# Patient Record
Sex: FEMALE | Race: WHITE | HISPANIC OR LATINO | URBAN - METROPOLITAN AREA
[De-identification: names, ages, dates, MRNs, and addresses within clinical notes are randomized per-mention and may not be internally consistent; named-entity substitution may affect disease eponyms.]

---

## 2020-11-24 ENCOUNTER — OFFICE VISIT (OUTPATIENT)
Dept: URBAN - METROPOLITAN AREA CLINIC 118 | Facility: CLINIC | Age: 65
End: 2020-11-24
Payer: COMMERCIAL

## 2020-11-24 ENCOUNTER — LAB OUTSIDE AN ENCOUNTER (OUTPATIENT)
Dept: URBAN - METROPOLITAN AREA CLINIC 118 | Facility: CLINIC | Age: 65
End: 2020-11-24

## 2020-11-24 DIAGNOSIS — R10.84 GENERALIZED ABDOMINAL PAIN: ICD-10-CM

## 2020-11-24 DIAGNOSIS — K59.00 CONSTIPATION, UNSPECIFIED CONSTIPATION TYPE: ICD-10-CM

## 2020-11-24 DIAGNOSIS — K58.1 IRRITABLE BOWEL SYNDROME WITH CONSTIPATION: ICD-10-CM

## 2020-11-24 DIAGNOSIS — R14.0 ABDOMINAL BLOATING: ICD-10-CM

## 2020-11-24 DIAGNOSIS — R10.13 EPIGASTRIC PAIN: ICD-10-CM

## 2020-11-24 DIAGNOSIS — Z86.010 PERSONAL HISTORY OF COLONIC POLYPS: ICD-10-CM

## 2020-11-24 PROCEDURE — 4004F PT TOBACCO SCREEN RCVD TLK: CPT | Performed by: INTERNAL MEDICINE

## 2020-11-24 PROCEDURE — 3017F COLORECTAL CA SCREEN DOC REV: CPT | Performed by: INTERNAL MEDICINE

## 2020-11-24 PROCEDURE — 99204 OFFICE O/P NEW MOD 45 MIN: CPT | Performed by: INTERNAL MEDICINE

## 2020-11-24 PROCEDURE — G8417 CALC BMI ABV UP PARAM F/U: HCPCS | Performed by: INTERNAL MEDICINE

## 2020-11-24 PROCEDURE — G8482 FLU IMMUNIZE ORDER/ADMIN: HCPCS | Performed by: INTERNAL MEDICINE

## 2020-11-24 PROCEDURE — G8427 DOCREV CUR MEDS BY ELIG CLIN: HCPCS | Performed by: INTERNAL MEDICINE

## 2020-11-24 RX ORDER — CARVEDILOL 6.25 MG/1
1 TABLET WITH FOOD TABLET, FILM COATED ORAL TWICE A DAY
Status: ACTIVE | COMMUNITY

## 2020-11-24 RX ORDER — POLYETHYLENE GLYCOL 3350, SODIUM SULFATE ANHYDROUS, SODIUM BICARBONATE, SODIUM CHLORIDE, POTASSIUM CHLORIDE 227.1; 21.5; 6.36; 5.53; .754 G/L; G/L; G/L; G/L; G/L
AS DIRECTED POWDER, FOR SOLUTION ORAL ONCE
Qty: 1 GALLON | Refills: 0 | OUTPATIENT
Start: 2020-11-24 | End: 2020-11-25

## 2020-11-24 RX ORDER — FLUTICASONE PROPIONATE 50 UG/1
1 SPRAY IN EACH NOSTRIL SPRAY, METERED NASAL ONCE A DAY
Status: ACTIVE | COMMUNITY

## 2020-11-24 RX ORDER — PANTOPRAZOLE SODIUM 40 MG/1
1 TABLET TABLET, DELAYED RELEASE ORAL ONCE A DAY
Status: ACTIVE | COMMUNITY

## 2020-11-24 RX ORDER — DICLOFENAC 35 MG/1
1 CAPSULE AS NEEDED CAPSULE ORAL THREE TIMES A DAY
Status: ACTIVE | COMMUNITY

## 2020-11-24 RX ORDER — ASPIRIN 81 MG/1
1 TABLET TABLET, CHEWABLE ORAL ONCE A DAY
Status: ACTIVE | COMMUNITY

## 2020-11-24 RX ORDER — ATORVASTATIN CALCIUM 10 MG/1
1 TABLET TABLET, FILM COATED ORAL ONCE A DAY
Status: ACTIVE | COMMUNITY

## 2020-11-24 RX ORDER — ALLOPURINOL 100 MG/1
1 TABLET TABLET ORAL ONCE A DAY
Status: ACTIVE | COMMUNITY

## 2020-11-24 RX ORDER — LISINOPRIL 20 MG/1
1 TABLET TABLET ORAL ONCE A DAY
Status: ACTIVE | COMMUNITY

## 2020-11-24 RX ORDER — GABAPENTIN 600 MG/1
1 TABLET TABLET, FILM COATED ORAL ONCE A DAY
Status: ACTIVE | COMMUNITY

## 2020-11-24 RX ORDER — MELATONIN 5 MG
1 TABLET IN THE EVENING TABLET ORAL ONCE A DAY
Status: ACTIVE | COMMUNITY

## 2020-11-24 RX ORDER — DICYCLOMINE HYDROCHLORIDE 20 MG/1
1 TABLET TABLET ORAL THREE TIMES A DAY
Status: ACTIVE | COMMUNITY

## 2020-11-24 RX ORDER — TURMERIC/TURMERIC ROOT EXTRACT 450MG-50MG
AS DIRECTED CAPSULE ORAL
Status: ACTIVE | COMMUNITY

## 2020-11-24 RX ORDER — MELOXICAM 15 MG/1
1 TABLET TABLET ORAL ONCE A DAY
Status: ACTIVE | COMMUNITY

## 2020-11-24 RX ORDER — GLIPIZIDE 5 MG/1
1 TABLET 30 MINUTES BEFORE BREAKFAST TABLET ORAL ONCE A DAY
Status: ACTIVE | COMMUNITY

## 2020-11-24 RX ORDER — TRAMADOL HYDROCHLORIDE 50 MG/1
1 TABLET AS NEEDED TABLET, FILM COATED ORAL ONCE A DAY
Status: ACTIVE | COMMUNITY

## 2020-11-24 NOTE — HPI-TODAY'S VISIT:
This is a 66 yo female with pmh of CKD, depression, fibromyalgia, GERD and asthma here for new patient visit for abdominal pain and bloating.  She reports having postprandial abdominal bloating/distension and generalized pain for past several months. No particular trigger foods. No nausea/vomiting. She also has constipation with stool every 3-4 days. Has tried miralax without much improvement.  She previously was evaluated in 2015 at [facility] for chronic diarrhea. EGD and colonoscopy done. Colonoscopy showed serrated polyps and inadequate prep. Diarrhea improved after stopping metformin.

## 2020-12-22 ENCOUNTER — OFFICE VISIT (OUTPATIENT)
Dept: URBAN - METROPOLITAN AREA SURGERY CENTER 23 | Facility: SURGERY CENTER | Age: 65
End: 2020-12-22
Payer: COMMERCIAL

## 2020-12-22 DIAGNOSIS — Z86.010 H/O ADENOMATOUS POLYP OF COLON: ICD-10-CM

## 2020-12-22 DIAGNOSIS — D12.2 ADENOMA OF ASCENDING COLON: ICD-10-CM

## 2020-12-22 PROCEDURE — G8907 PT DOC NO EVENTS ON DISCHARG: HCPCS | Performed by: INTERNAL MEDICINE

## 2020-12-22 PROCEDURE — 45385 COLONOSCOPY W/LESION REMOVAL: CPT | Performed by: INTERNAL MEDICINE

## 2020-12-22 PROCEDURE — G9936 PMH PLYP/NEO CO/RECT/JUN/ANS: HCPCS | Performed by: INTERNAL MEDICINE

## 2020-12-22 RX ORDER — TURMERIC/TURMERIC ROOT EXTRACT 450MG-50MG
AS DIRECTED CAPSULE ORAL
Status: ACTIVE | COMMUNITY

## 2020-12-22 RX ORDER — MELATONIN 5 MG
1 TABLET IN THE EVENING TABLET ORAL ONCE A DAY
Status: ACTIVE | COMMUNITY

## 2020-12-22 RX ORDER — DICYCLOMINE HYDROCHLORIDE 20 MG/1
1 TABLET TABLET ORAL THREE TIMES A DAY
Status: ACTIVE | COMMUNITY

## 2020-12-22 RX ORDER — GABAPENTIN 600 MG/1
1 TABLET TABLET, FILM COATED ORAL ONCE A DAY
Status: ACTIVE | COMMUNITY

## 2020-12-22 RX ORDER — GLIPIZIDE 5 MG/1
1 TABLET 30 MINUTES BEFORE BREAKFAST TABLET ORAL ONCE A DAY
Status: ACTIVE | COMMUNITY

## 2020-12-22 RX ORDER — FLUTICASONE PROPIONATE 50 UG/1
1 SPRAY IN EACH NOSTRIL SPRAY, METERED NASAL ONCE A DAY
Status: ACTIVE | COMMUNITY

## 2020-12-22 RX ORDER — ALLOPURINOL 100 MG/1
1 TABLET TABLET ORAL ONCE A DAY
Status: ACTIVE | COMMUNITY

## 2020-12-22 RX ORDER — ATORVASTATIN CALCIUM 10 MG/1
1 TABLET TABLET, FILM COATED ORAL ONCE A DAY
Status: ACTIVE | COMMUNITY

## 2020-12-22 RX ORDER — MELOXICAM 15 MG/1
1 TABLET TABLET ORAL ONCE A DAY
Status: ACTIVE | COMMUNITY

## 2020-12-22 RX ORDER — ASPIRIN 81 MG/1
1 TABLET TABLET, CHEWABLE ORAL ONCE A DAY
Status: ACTIVE | COMMUNITY

## 2020-12-22 RX ORDER — CARVEDILOL 6.25 MG/1
1 TABLET WITH FOOD TABLET, FILM COATED ORAL TWICE A DAY
Status: ACTIVE | COMMUNITY

## 2020-12-22 RX ORDER — PANTOPRAZOLE SODIUM 40 MG/1
1 TABLET TABLET, DELAYED RELEASE ORAL ONCE A DAY
Status: ACTIVE | COMMUNITY

## 2020-12-22 RX ORDER — TRAMADOL HYDROCHLORIDE 50 MG/1
1 TABLET AS NEEDED TABLET, FILM COATED ORAL ONCE A DAY
Status: ACTIVE | COMMUNITY

## 2020-12-22 RX ORDER — LISINOPRIL 20 MG/1
1 TABLET TABLET ORAL ONCE A DAY
Status: ACTIVE | COMMUNITY

## 2020-12-22 RX ORDER — DICLOFENAC 35 MG/1
1 CAPSULE AS NEEDED CAPSULE ORAL THREE TIMES A DAY
Status: ACTIVE | COMMUNITY

## 2020-12-25 ENCOUNTER — WEB ENCOUNTER (OUTPATIENT)
Dept: URBAN - METROPOLITAN AREA CLINIC 118 | Facility: CLINIC | Age: 65
End: 2020-12-25

## 2021-01-25 ENCOUNTER — OFFICE VISIT (OUTPATIENT)
Dept: URBAN - METROPOLITAN AREA CLINIC 118 | Facility: CLINIC | Age: 66
End: 2021-01-25

## 2021-03-01 ENCOUNTER — OFFICE VISIT (OUTPATIENT)
Dept: URBAN - METROPOLITAN AREA CLINIC 25 | Facility: CLINIC | Age: 66
End: 2021-03-01

## 2021-03-01 RX ORDER — GABAPENTIN 600 MG/1
1 TABLET TABLET, FILM COATED ORAL ONCE A DAY
COMMUNITY

## 2021-03-01 RX ORDER — MELATONIN 5 MG
1 TABLET IN THE EVENING TABLET ORAL ONCE A DAY
COMMUNITY

## 2021-03-01 RX ORDER — MELOXICAM 15 MG/1
1 TABLET TABLET ORAL ONCE A DAY
COMMUNITY

## 2021-03-01 RX ORDER — DICLOFENAC 35 MG/1
1 CAPSULE AS NEEDED CAPSULE ORAL THREE TIMES A DAY
COMMUNITY

## 2021-03-01 RX ORDER — LISINOPRIL 20 MG/1
1 TABLET TABLET ORAL ONCE A DAY
COMMUNITY

## 2021-03-01 RX ORDER — CARVEDILOL 6.25 MG/1
1 TABLET WITH FOOD TABLET, FILM COATED ORAL TWICE A DAY
COMMUNITY

## 2021-03-01 RX ORDER — FLUTICASONE PROPIONATE 50 UG/1
1 SPRAY IN EACH NOSTRIL SPRAY, METERED NASAL ONCE A DAY
COMMUNITY

## 2021-03-01 RX ORDER — GLIPIZIDE 5 MG/1
1 TABLET 30 MINUTES BEFORE BREAKFAST TABLET ORAL ONCE A DAY
COMMUNITY

## 2021-03-01 RX ORDER — PANTOPRAZOLE SODIUM 40 MG/1
1 TABLET TABLET, DELAYED RELEASE ORAL ONCE A DAY
COMMUNITY

## 2021-03-01 RX ORDER — TURMERIC/TURMERIC ROOT EXTRACT 450MG-50MG
AS DIRECTED CAPSULE ORAL
COMMUNITY

## 2021-03-01 RX ORDER — DICYCLOMINE HYDROCHLORIDE 20 MG/1
1 TABLET TABLET ORAL THREE TIMES A DAY
COMMUNITY

## 2021-03-01 RX ORDER — ATORVASTATIN CALCIUM 10 MG/1
1 TABLET TABLET, FILM COATED ORAL ONCE A DAY
COMMUNITY

## 2021-03-01 RX ORDER — ALLOPURINOL 100 MG/1
1 TABLET TABLET ORAL ONCE A DAY
COMMUNITY

## 2021-03-01 RX ORDER — ASPIRIN 81 MG/1
1 TABLET TABLET, CHEWABLE ORAL ONCE A DAY
COMMUNITY

## 2021-03-01 RX ORDER — TRAMADOL HYDROCHLORIDE 50 MG/1
1 TABLET AS NEEDED TABLET, FILM COATED ORAL ONCE A DAY
COMMUNITY

## 2021-03-03 ENCOUNTER — OUT OF OFFICE VISIT (OUTPATIENT)
Dept: URBAN - METROPOLITAN AREA MEDICAL CENTER 8 | Facility: MEDICAL CENTER | Age: 66
End: 2021-03-03
Payer: COMMERCIAL

## 2021-03-03 DIAGNOSIS — R11.2 ACUTE NAUSEA WITH NONBILIOUS VOMITING: ICD-10-CM

## 2021-03-03 DIAGNOSIS — R65.21 SEVERE SEPSIS WITH SEPTIC SHOCK: ICD-10-CM

## 2021-03-03 DIAGNOSIS — R19.7 ACUTE DIARRHEA: ICD-10-CM

## 2021-03-03 DIAGNOSIS — R74.8 ABNORMAL ALKALINE PHOSPHATASE TEST: ICD-10-CM

## 2021-03-03 DIAGNOSIS — R11.0 CHRONIC NAUSEA: ICD-10-CM

## 2021-03-03 PROCEDURE — 99232 SBSQ HOSP IP/OBS MODERATE 35: CPT | Performed by: PHYSICIAN ASSISTANT

## 2021-03-03 PROCEDURE — G8427 DOCREV CUR MEDS BY ELIG CLIN: HCPCS | Performed by: PHYSICIAN ASSISTANT

## 2021-03-03 PROCEDURE — 99222 1ST HOSP IP/OBS MODERATE 55: CPT | Performed by: PHYSICIAN ASSISTANT

## 2021-03-11 ENCOUNTER — OUT OF OFFICE VISIT (OUTPATIENT)
Dept: URBAN - METROPOLITAN AREA MEDICAL CENTER 8 | Facility: MEDICAL CENTER | Age: 66
End: 2021-03-11
Payer: COMMERCIAL

## 2021-03-11 DIAGNOSIS — R19.7 ACUTE DIARRHEA: ICD-10-CM

## 2021-03-11 DIAGNOSIS — R74.8 ABNORMAL ALKALINE PHOSPHATASE TEST: ICD-10-CM

## 2021-03-11 DIAGNOSIS — R11.2 ACUTE NAUSEA WITH NONBILIOUS VOMITING: ICD-10-CM

## 2021-03-11 PROCEDURE — 99232 SBSQ HOSP IP/OBS MODERATE 35: CPT | Performed by: PHYSICIAN ASSISTANT

## 2021-03-12 ENCOUNTER — OUT OF OFFICE VISIT (OUTPATIENT)
Dept: URBAN - METROPOLITAN AREA MEDICAL CENTER 8 | Facility: MEDICAL CENTER | Age: 66
End: 2021-03-12
Payer: COMMERCIAL

## 2021-03-12 DIAGNOSIS — R10.84 ABDOMINAL CRAMPING, GENERALIZED: ICD-10-CM

## 2021-03-12 DIAGNOSIS — R11.2 ACUTE NAUSEA WITH NONBILIOUS VOMITING: ICD-10-CM

## 2021-03-12 DIAGNOSIS — K31.89 ACQUIRED DEFORMITY OF DUODENUM: ICD-10-CM

## 2021-03-12 DIAGNOSIS — C7A.010 MALIGNANT CARCINOID TUMOR OF DUODENUM: ICD-10-CM

## 2021-03-12 PROCEDURE — 43239 EGD BIOPSY SINGLE/MULTIPLE: CPT | Performed by: INTERNAL MEDICINE

## 2021-03-15 ENCOUNTER — OUT OF OFFICE VISIT (OUTPATIENT)
Dept: URBAN - METROPOLITAN AREA MEDICAL CENTER 8 | Facility: MEDICAL CENTER | Age: 66
End: 2021-03-15
Payer: COMMERCIAL

## 2021-03-15 DIAGNOSIS — R11.2 ACUTE NAUSEA WITH NONBILIOUS VOMITING: ICD-10-CM

## 2021-03-15 DIAGNOSIS — R10.30 ABDOMINAL PAIN OF UNKNOWN CAUSE: ICD-10-CM

## 2021-03-15 DIAGNOSIS — R19.7 ACUTE DIARRHEA: ICD-10-CM

## 2021-03-15 DIAGNOSIS — K86.89 ATROPHIC PANCREAS: ICD-10-CM

## 2021-03-15 DIAGNOSIS — R74.8 ABNORMAL ALKALINE PHOSPHATASE TEST: ICD-10-CM

## 2021-03-15 DIAGNOSIS — C7A.010 MALIGNANT CARCINOID TUMOR OF DUODENUM: ICD-10-CM

## 2021-03-15 PROCEDURE — 99232 SBSQ HOSP IP/OBS MODERATE 35: CPT | Performed by: PHYSICIAN ASSISTANT

## 2021-03-16 ENCOUNTER — TELEPHONE ENCOUNTER (OUTPATIENT)
Dept: URBAN - METROPOLITAN AREA CLINIC 25 | Facility: CLINIC | Age: 66
End: 2021-03-16

## 2021-04-23 ENCOUNTER — OUT OF OFFICE VISIT (OUTPATIENT)
Dept: URBAN - METROPOLITAN AREA MEDICAL CENTER 8 | Facility: MEDICAL CENTER | Age: 66
End: 2021-04-23
Payer: COMMERCIAL

## 2021-04-23 DIAGNOSIS — D3A.010 BENIGN CARCINOID TUMOR OF DUODENUM: ICD-10-CM

## 2021-04-23 DIAGNOSIS — R10.84 ABDOMINAL CRAMPING, GENERALIZED: ICD-10-CM

## 2021-04-23 DIAGNOSIS — R11.0 CHRONIC NAUSEA: ICD-10-CM

## 2021-04-23 DIAGNOSIS — R19.7 ACUTE DIARRHEA: ICD-10-CM

## 2021-04-23 PROCEDURE — G8427 DOCREV CUR MEDS BY ELIG CLIN: HCPCS | Performed by: PHYSICIAN ASSISTANT

## 2021-04-23 PROCEDURE — 99222 1ST HOSP IP/OBS MODERATE 55: CPT | Performed by: PHYSICIAN ASSISTANT

## 2021-04-24 ENCOUNTER — OUT OF OFFICE VISIT (OUTPATIENT)
Dept: URBAN - METROPOLITAN AREA MEDICAL CENTER 8 | Facility: MEDICAL CENTER | Age: 66
End: 2021-04-24
Payer: COMMERCIAL

## 2021-04-24 DIAGNOSIS — D3A.010 BENIGN CARCINOID TUMOR OF DUODENUM: ICD-10-CM

## 2021-04-24 DIAGNOSIS — R10.84 ABDOMINAL CRAMPING, GENERALIZED: ICD-10-CM

## 2021-04-24 DIAGNOSIS — R19.7 ACUTE DIARRHEA: ICD-10-CM

## 2021-04-24 PROCEDURE — 99232 SBSQ HOSP IP/OBS MODERATE 35: CPT | Performed by: INTERNAL MEDICINE

## 2021-04-25 ENCOUNTER — OUT OF OFFICE VISIT (OUTPATIENT)
Dept: URBAN - METROPOLITAN AREA MEDICAL CENTER 8 | Facility: MEDICAL CENTER | Age: 66
End: 2021-04-25
Payer: COMMERCIAL

## 2021-04-25 DIAGNOSIS — D3A.010 BENIGN CARCINOID TUMOR OF DUODENUM: ICD-10-CM

## 2021-04-25 DIAGNOSIS — R19.7 ACUTE DIARRHEA: ICD-10-CM

## 2021-04-25 DIAGNOSIS — R11.0 CHRONIC NAUSEA: ICD-10-CM

## 2021-04-25 PROCEDURE — 99232 SBSQ HOSP IP/OBS MODERATE 35: CPT | Performed by: INTERNAL MEDICINE

## 2021-04-26 ENCOUNTER — OUT OF OFFICE VISIT (OUTPATIENT)
Dept: URBAN - METROPOLITAN AREA MEDICAL CENTER 8 | Facility: MEDICAL CENTER | Age: 66
End: 2021-04-26
Payer: COMMERCIAL

## 2021-04-26 DIAGNOSIS — D3A.010 BENIGN CARCINOID TUMOR OF DUODENUM: ICD-10-CM

## 2021-04-26 DIAGNOSIS — R10.84 ABDOMINAL CRAMPING, GENERALIZED: ICD-10-CM

## 2021-04-26 DIAGNOSIS — R19.7 ACUTE DIARRHEA: ICD-10-CM

## 2021-04-26 PROCEDURE — 99232 SBSQ HOSP IP/OBS MODERATE 35: CPT | Performed by: PHYSICIAN ASSISTANT

## 2021-05-14 ENCOUNTER — OFFICE VISIT (OUTPATIENT)
Dept: URBAN - METROPOLITAN AREA CLINIC 25 | Facility: CLINIC | Age: 66
End: 2021-05-14
Payer: COMMERCIAL

## 2021-05-14 ENCOUNTER — TELEPHONE ENCOUNTER (OUTPATIENT)
Dept: URBAN - METROPOLITAN AREA CLINIC 92 | Facility: CLINIC | Age: 66
End: 2021-05-14

## 2021-05-14 ENCOUNTER — WEB ENCOUNTER (OUTPATIENT)
Dept: URBAN - METROPOLITAN AREA CLINIC 25 | Facility: CLINIC | Age: 66
End: 2021-05-14

## 2021-05-14 ENCOUNTER — LAB OUTSIDE AN ENCOUNTER (OUTPATIENT)
Dept: URBAN - METROPOLITAN AREA CLINIC 25 | Facility: CLINIC | Age: 66
End: 2021-05-14

## 2021-05-14 VITALS
SYSTOLIC BLOOD PRESSURE: 139 MMHG | HEART RATE: 77 BPM | DIASTOLIC BLOOD PRESSURE: 75 MMHG | WEIGHT: 230 LBS | BODY MASS INDEX: 40.75 KG/M2 | HEIGHT: 63 IN | TEMPERATURE: 97.8 F

## 2021-05-14 DIAGNOSIS — R11.0 NAUSEA: ICD-10-CM

## 2021-05-14 DIAGNOSIS — D3A.8 BENIGN NEUROENDOCRINE TUMOR OF DUODENUM: ICD-10-CM

## 2021-05-14 DIAGNOSIS — K86.9 PANCREATIC LESION: ICD-10-CM

## 2021-05-14 DIAGNOSIS — Z78.9 ON TOTAL PARENTERAL NUTRITION (TPN): ICD-10-CM

## 2021-05-14 DIAGNOSIS — K58.9 IBS (IRRITABLE BOWEL SYNDROME): ICD-10-CM

## 2021-05-14 PROCEDURE — 99214 OFFICE O/P EST MOD 30 MIN: CPT | Performed by: INTERNAL MEDICINE

## 2021-05-14 RX ORDER — DICYCLOMINE HYDROCHLORIDE 20 MG/1
1 TABLET TABLET ORAL THREE TIMES A DAY
Qty: 270 TABLET | Refills: 2 | OUTPATIENT
Start: 2021-05-14 | End: 2022-02-07

## 2021-05-14 RX ORDER — OMEPRAZOLE 40 MG/1
1 CAPSULE 30 MINUTES BEFORE MORNING MEAL CAPSULE, DELAYED RELEASE ORAL ONCE A DAY
Status: ACTIVE | COMMUNITY

## 2021-05-14 NOTE — HPI-TODAY'S VISIT:
May 2021 visit    Patient was seen in March 2021 for nausea vomiting and diarrhea.  Recovered from COVID-19 infection in January 2021.  Was found to have acute renal failure needing dialysis.  Was also admitted to the ICU for septic shock.  Patient had admission to [facility] in February 2021 and was diagnosed with viral gastroenteritis, a CT scan there had revealed a 6 mm hypoattenuating pancreatic tail lesion with a small site that IPMN.  Colonoscopy in December 2020 with Dr. Gutiérrez revealed 80 distal ascending colon tubular adenoma and sigmoid diverticulosis.  Stool tests were negative for C. difficile.  CT abdomen pelvis without IV contrast revealed no acute findings in March 2021.  LFT abnormalities were felt to be secondary to shock.  Patient did have blood culture positive for gram-negative rods and was treated with antibiotics.  EGD March 2021 revealed a normal esophagus, small gastric polyp which was removed:fundic gland polyp, anterior wall duodenal polypoid lesion measuring 5 mm in size which was removed with cold biopsy forceps:well-differentiated neuroendocrine tumor CT chest abdomen and pelvis with contrast in April 2021 revealed indeterminate few soft tissue nodular densities in the ventral pelvic soft tissue largest measuring 2.7 x 2.2 cm, mildly enlarged spleen, mildly prominent endometrium, IR guided biopsy of abdominal nodules revealed extensive fat necrosis with foreign body giant cell reaction with no malignancy identified. Hepatitis B serology negative, hepatitis C antibody negative.  Elevated chromogranin A level normal serotonin, VIP, gastrin, urine HIAA level was 4.4  and repeated again 3.9.   at present no  vomiting, diarrhea. mild nausea.  Not on diaylsis at present. good appetite. but on tpn. normal gastric emptying study in march 2021. She reports intermittent abdominal bloating as well as discomfort in her abdomen just prior to defecation.  She is already taking daily PPI

## 2021-05-17 PROBLEM — 440630006: Status: ACTIVE | Noted: 2020-11-24

## 2021-06-01 ENCOUNTER — OFFICE VISIT (OUTPATIENT)
Dept: URBAN - METROPOLITAN AREA SURGERY CENTER 20 | Facility: SURGERY CENTER | Age: 66
End: 2021-06-01
Payer: COMMERCIAL

## 2021-06-01 ENCOUNTER — CLAIMS CREATED FROM THE CLAIM WINDOW (OUTPATIENT)
Dept: URBAN - METROPOLITAN AREA CLINIC 4 | Facility: CLINIC | Age: 66
End: 2021-06-01
Payer: COMMERCIAL

## 2021-06-01 DIAGNOSIS — D13.2 BENIGN NEOPLASM OF DUODENUM: ICD-10-CM

## 2021-06-01 DIAGNOSIS — K31.89 ACQUIRED DEFORMITY OF DUODENUM: ICD-10-CM

## 2021-06-01 DIAGNOSIS — K31.7 POLYP OF STOMACH AND DUODENUM: ICD-10-CM

## 2021-06-01 DIAGNOSIS — K31.89 SMALL STOMACH SYNDROME: ICD-10-CM

## 2021-06-01 DIAGNOSIS — Z86.012 PERSONAL HISTORY OF BENIGN CARCINOID TUMOR: ICD-10-CM

## 2021-06-01 DIAGNOSIS — K29.80 ACUTE DUODENITIS: ICD-10-CM

## 2021-06-01 PROCEDURE — 88342 IMHCHEM/IMCYTCHM 1ST ANTB: CPT | Performed by: PATHOLOGY

## 2021-06-01 PROCEDURE — 88312 SPECIAL STAINS GROUP 1: CPT | Performed by: PATHOLOGY

## 2021-06-01 PROCEDURE — G8907 PT DOC NO EVENTS ON DISCHARG: HCPCS | Performed by: INTERNAL MEDICINE

## 2021-06-01 PROCEDURE — 43239 EGD BIOPSY SINGLE/MULTIPLE: CPT | Performed by: INTERNAL MEDICINE

## 2021-06-01 PROCEDURE — 88305 TISSUE EXAM BY PATHOLOGIST: CPT | Performed by: PATHOLOGY

## 2021-06-08 ENCOUNTER — TELEPHONE ENCOUNTER (OUTPATIENT)
Dept: URBAN - METROPOLITAN AREA CLINIC 92 | Facility: CLINIC | Age: 66
End: 2021-06-08

## 2021-06-16 ENCOUNTER — LAB OUTSIDE AN ENCOUNTER (OUTPATIENT)
Dept: URBAN - METROPOLITAN AREA CLINIC 92 | Facility: CLINIC | Age: 66
End: 2021-06-16

## 2021-08-11 ENCOUNTER — OFFICE VISIT (OUTPATIENT)
Dept: URBAN - METROPOLITAN AREA CLINIC 25 | Facility: CLINIC | Age: 66
End: 2021-08-11

## 2021-09-27 ENCOUNTER — LAB OUTSIDE AN ENCOUNTER (OUTPATIENT)
Dept: URBAN - METROPOLITAN AREA CLINIC 25 | Facility: CLINIC | Age: 66
End: 2021-09-27

## 2021-09-27 ENCOUNTER — OFFICE VISIT (OUTPATIENT)
Dept: URBAN - METROPOLITAN AREA CLINIC 25 | Facility: CLINIC | Age: 66
End: 2021-09-27
Payer: COMMERCIAL

## 2021-09-27 DIAGNOSIS — K86.9 PANCREATIC LESION: ICD-10-CM

## 2021-09-27 DIAGNOSIS — D3A.8 BENIGN NEUROENDOCRINE TUMOR OF DUODENUM: ICD-10-CM

## 2021-09-27 DIAGNOSIS — Z86.010 PERSONAL HISTORY OF COLONIC POLYPS: ICD-10-CM

## 2021-09-27 DIAGNOSIS — Z90.49 HISTORY OF CHOLECYSTECTOMY: ICD-10-CM

## 2021-09-27 DIAGNOSIS — K59.1 FUNCTIONAL DIARRHEA: ICD-10-CM

## 2021-09-27 DIAGNOSIS — K31.89 INTESTINAL METAPLASIA OF GASTRIC MUCOSA: ICD-10-CM

## 2021-09-27 DIAGNOSIS — K58.9 IBS (IRRITABLE BOWEL SYNDROME): ICD-10-CM

## 2021-09-27 PROCEDURE — 99214 OFFICE O/P EST MOD 30 MIN: CPT | Performed by: INTERNAL MEDICINE

## 2021-09-27 RX ORDER — DICYCLOMINE HYDROCHLORIDE 20 MG/1
1 TABLET TABLET ORAL THREE TIMES A DAY
Qty: 270 TABLET | Refills: 2 | Status: ACTIVE | COMMUNITY
Start: 2021-05-14 | End: 2022-02-07

## 2021-09-27 RX ORDER — SODIUM, POTASSIUM,MAG SULFATES 17.5-3.13G
1 KIT SOLUTION, RECONSTITUTED, ORAL ORAL
Qty: 1 KIT (354ML) | Refills: 0 | OUTPATIENT
Start: 2021-09-27 | End: 2021-09-28

## 2021-09-27 RX ORDER — OMEPRAZOLE 40 MG/1
1 CAPSULE 30 MINUTES BEFORE MORNING MEAL CAPSULE, DELAYED RELEASE ORAL ONCE A DAY
Status: ACTIVE | COMMUNITY

## 2021-09-27 NOTE — HPI-TODAY'S VISIT:
September 2021 visit:    Language line  was offered and used.  Patient underwent a MRI of the abdomen with and without contrast in July 2021 which revealed a normal-appearing liver, absent gallbladder, 4 mm lesion along the inferior aspect of the pancreatic body most likely reflecting a small area of focal fat, mild adrenal hyperplasia, scattered kidney cysts, subcentimeter abdominal lymph nodes, no suspicious pancreatic lesion identified. Labs from September 2021 revealed a hemoglobin of 11.8, normal WBC, normal platelet count, creatinine 1.4, normal LFTs except slightly elevated alkaline phosphatase of 128, ferritin 19. Patient underwent a EGD in June 2021 which revealed mild gastritis, patchy erythematous and whitish appearing specks in the gastric antrum which were biopsied, no small bowel lesion up to second portion of duodenum, biopsies from duodenal bulb revealed peptic duodenitis, gastric biopsies revealed foveolar hyperplasia and focal intestinal metaplasia, small bowel biopsy did not reveal any celiac disease.  Patient did have a colonoscopy in December 2020 which revealed a 1.5 cm distal ascending colon polyp that was removed piecemeal and therefore a repeat colonoscopy was advised in 6 months, diverticulosis was seen in the sigmoid colon.   Diarrhea 3-4 times per week. on lomotil / another capsule - cant recall name. pt seeing dr nathan. on monthly sandostatin. normal po intake. no nausea. not on tpn.

## 2021-09-27 NOTE — HPI-OTHER HISTORIES
May 2021 visit:     Patient was seen in March 2021 for nausea vomiting and diarrhea.  Recovered from COVID-19 infection in January 2021.  Was found to have acute renal failure needing dialysis.  Was also admitted to the ICU for septic shock.  Patient had admission to [facility] in February 2021 and was diagnosed with viral gastroenteritis, a CT scan there had revealed a 6 mm hypoattenuating pancreatic tail lesion with a small site that IPMN.  Colonoscopy in December 2020 with Dr. Gutiérrez revealed distal ascending colon tubular adenoma and sigmoid diverticulosis.  Stool tests were negative for C. difficile.  CT abdomen pelvis without IV contrast revealed no acute findings in March 2021.  LFT abnormalities were felt to be secondary to shock.  Patient did have blood culture positive for gram-negative rods and was treated with antibiotics.  EGD March 2021 revealed a normal esophagus, small gastric polyp which was removed:fundic gland polyp, anterior wall duodenal polypoid lesion measuring 5 mm in size which was removed with cold biopsy forceps:well-differentiated neuroendocrine tumor CT chest abdomen and pelvis with contrast in April 2021 revealed indeterminate few soft tissue nodular densities in the ventral pelvic soft tissue largest measuring 2.7 x 2.2 cm, mildly enlarged spleen, mildly prominent endometrium, IR guided biopsy of abdominal nodules revealed extensive fat necrosis with foreign body giant cell reaction with no malignancy identified. Hepatitis B serology negative, hepatitis C antibody negative.  Elevated chromogranin A level normal serotonin, VIP, gastrin, urine HIAA level was 4.4  and repeated again 3.9.   at present no  vomiting, diarrhea. mild nausea.  Not on diaylsis at present. good appetite. but on tpn. normal gastric emptying study in march 2021. She reports intermittent abdominal bloating as well as discomfort in her abdomen just prior to defecation.  She is already taking daily PPI

## 2021-10-01 PROBLEM — 14760008: Status: ACTIVE | Noted: 2020-11-24

## 2021-10-08 ENCOUNTER — OFFICE VISIT (OUTPATIENT)
Dept: URBAN - METROPOLITAN AREA MEDICAL CENTER 8 | Facility: MEDICAL CENTER | Age: 66
End: 2021-10-08
Payer: COMMERCIAL

## 2021-10-08 DIAGNOSIS — K63.5 BENIGN COLON POLYP: ICD-10-CM

## 2021-10-08 DIAGNOSIS — Z86.010 ADENOMAS PERSONAL HISTORY OF COLONIC POLYPS: ICD-10-CM

## 2021-10-08 DIAGNOSIS — D12.5 ADENOMA OF SIGMOID COLON: ICD-10-CM

## 2021-10-08 DIAGNOSIS — R19.7 ACUTE DIARRHEA: ICD-10-CM

## 2021-10-08 PROCEDURE — 45385 COLONOSCOPY W/LESION REMOVAL: CPT | Performed by: INTERNAL MEDICINE

## 2021-10-08 PROCEDURE — G0500 MOD SEDAT ENDO SERVICE >5YRS: HCPCS | Performed by: INTERNAL MEDICINE

## 2021-10-08 PROCEDURE — 99153 MOD SED SAME PHYS/QHP EA: CPT | Performed by: INTERNAL MEDICINE

## 2021-10-08 PROCEDURE — 45380 COLONOSCOPY AND BIOPSY: CPT | Performed by: INTERNAL MEDICINE

## 2021-10-13 ENCOUNTER — TELEPHONE ENCOUNTER (OUTPATIENT)
Dept: URBAN - METROPOLITAN AREA CLINIC 92 | Facility: CLINIC | Age: 66
End: 2021-10-13

## 2021-10-18 ENCOUNTER — TELEPHONE ENCOUNTER (OUTPATIENT)
Dept: URBAN - METROPOLITAN AREA CLINIC 92 | Facility: CLINIC | Age: 66
End: 2021-10-18

## 2022-01-10 ENCOUNTER — OFFICE VISIT (OUTPATIENT)
Dept: URBAN - METROPOLITAN AREA CLINIC 25 | Facility: CLINIC | Age: 67
End: 2022-01-10
Payer: COMMERCIAL

## 2022-01-10 VITALS
SYSTOLIC BLOOD PRESSURE: 96 MMHG | HEIGHT: 63 IN | TEMPERATURE: 97.3 F | DIASTOLIC BLOOD PRESSURE: 68 MMHG | WEIGHT: 225 LBS | BODY MASS INDEX: 39.87 KG/M2 | HEART RATE: 84 BPM

## 2022-01-10 DIAGNOSIS — K31.89 INTESTINAL METAPLASIA OF GASTRIC MUCOSA: ICD-10-CM

## 2022-01-10 DIAGNOSIS — K58.9 IBS (IRRITABLE BOWEL SYNDROME): ICD-10-CM

## 2022-01-10 DIAGNOSIS — K59.1 FUNCTIONAL DIARRHEA: ICD-10-CM

## 2022-01-10 DIAGNOSIS — K86.9 PANCREATIC LESION: ICD-10-CM

## 2022-01-10 DIAGNOSIS — Z90.49 HISTORY OF CHOLECYSTECTOMY: ICD-10-CM

## 2022-01-10 DIAGNOSIS — Z86.010 PERSONAL HISTORY OF COLONIC POLYPS: ICD-10-CM

## 2022-01-10 DIAGNOSIS — D3A.8 BENIGN NEUROENDOCRINE TUMOR OF DUODENUM: ICD-10-CM

## 2022-01-10 PROCEDURE — 99213 OFFICE O/P EST LOW 20 MIN: CPT | Performed by: INTERNAL MEDICINE

## 2022-01-10 RX ORDER — OMEPRAZOLE 40 MG/1
1 CAPSULE 30 MINUTES BEFORE MORNING MEAL CAPSULE, DELAYED RELEASE ORAL ONCE A DAY
Status: ACTIVE | COMMUNITY

## 2022-01-10 RX ORDER — DICYCLOMINE HYDROCHLORIDE 20 MG/1
1 TABLET TABLET ORAL THREE TIMES A DAY
Qty: 270 TABLET | Refills: 2 | Status: ACTIVE | COMMUNITY
Start: 2021-05-14 | End: 2022-02-07

## 2022-01-10 NOTE — HPI-TODAY'S VISIT:
January 2022 visit:  Language line  was offered and used.  A colonoscopy was performed in October 2021 which revealed an adequate prep, normal TI, biopsies were obtained from throughout the colon as well as TI to evaluate for chronic diarrhea, small ascending colon polyp removed: Hyperplastic polyp, another 8 mm sigmoid colon polyp removed: Tubular adenoma, left-sided diverticulosis, repeat colonoscopy advised in 2024.  No endoscopic colitis on path. MRI abdomen with and without contrast in July 2021 did not reveal any suspicious pancreatic lesion, normal liver, absent gallbladder, no dilation of the pancreatic duct present, a small area of focal fat seen in the inferior aspect of the pancreatic body, mild adrenal gland hyperplasia.   Diarrhea resolved. takes loperamide prn. takes sandostatin 1 per month. regular po intake no abdominal pain.

## 2022-01-10 NOTE — HPI-OTHER HISTORIES
September 2021 visit:  Language line  was offered and used.  Patient underwent a MRI of the abdomen with and without contrast in July 2021 which revealed a normal-appearing liver, absent gallbladder, 4 mm lesion along the inferior aspect of the pancreatic body most likely reflecting a small area of focal fat, mild adrenal hyperplasia, scattered kidney cysts, subcentimeter abdominal lymph nodes, no suspicious pancreatic lesion identified. Labs from September 2021 revealed a hemoglobin of 11.8, normal WBC, normal platelet count, creatinine 1.4, normal LFTs except slightly elevated alkaline phosphatase of 128, ferritin 19. Patient underwent a EGD in June 2021 which revealed mild gastritis, patchy erythematous and whitish appearing specks in the gastric antrum which were biopsied, no small bowel lesion up to second portion of duodenum, biopsies from duodenal bulb revealed peptic duodenitis, gastric biopsies revealed foveolar hyperplasia and focal intestinal metaplasia, small bowel biopsy did not reveal any celiac disease.  Patient did have a colonoscopy in December 2020 which revealed a 1.5 cm distal ascending colon polyp that was removed piecemeal and therefore a repeat colonoscopy was advised in 6 months, diverticulosis was seen in the sigmoid colon.   Diarrhea 3-4 times per week. on lomotil / another capsule - cant recall name. pt seeing dr nathan. on monthly sandostatin. normal po intake. no nausea. not on tpn.   May 2021 visit:  Patient was seen in March 2021 for nausea vomiting and diarrhea.  Recovered from COVID-19 infection in January 2021.  Was found to have acute renal failure needing dialysis.  Was also admitted to the ICU for septic shock.  Patient had admission to [facility] in February 2021 and was diagnosed with viral gastroenteritis, a CT scan there had revealed a 6 mm hypoattenuating pancreatic tail lesion with a small site that IPMN.  Colonoscopy in December 2020 with Dr. Gutiérrez revealed distal ascending colon tubular adenoma and sigmoid diverticulosis.  Stool tests were negative for C. difficile.  CT abdomen pelvis without IV contrast revealed no acute findings in March 2021.  LFT abnormalities were felt to be secondary to shock.  Patient did have blood culture positive for gram-negative rods and was treated with antibiotics.  EGD March 2021 revealed a normal esophagus, small gastric polyp which was removed:fundic gland polyp, anterior wall duodenal polypoid lesion measuring 5 mm in size which was removed with cold biopsy forceps:well-differentiated neuroendocrine tumor CT chest abdomen and pelvis with contrast in April 2021 revealed indeterminate few soft tissue nodular densities in the ventral pelvic soft tissue largest measuring 2.7 x 2.2 cm, mildly enlarged spleen, mildly prominent endometrium, IR guided biopsy of abdominal nodules revealed extensive fat necrosis with foreign body giant cell reaction with no malignancy identified. Hepatitis B serology negative, hepatitis C antibody negative.  Elevated chromogranin A level normal serotonin, VIP, gastrin, urine HIAA level was 4.4  and repeated again 3.9.   at present no  vomiting, diarrhea. mild nausea.  Not on diaylsis at present. good appetite. but on tpn. normal gastric emptying study in march 2021. She reports intermittent abdominal bloating as well as discomfort in her abdomen just prior to defecation.  She is already taking daily PPI

## 2022-06-30 ENCOUNTER — ERX REFILL RESPONSE (OUTPATIENT)
Dept: URBAN - METROPOLITAN AREA CLINIC 25 | Facility: CLINIC | Age: 67
End: 2022-06-30

## 2022-06-30 RX ORDER — DICYCLOMINE HYDROCHLORIDE 20 MG/1
1 TABLET TABLET ORAL THREE TIMES A DAY
Qty: 270 TABLET | Refills: 2 | OUTPATIENT

## 2022-07-22 ENCOUNTER — OFFICE VISIT (OUTPATIENT)
Dept: URBAN - METROPOLITAN AREA CLINIC 25 | Facility: CLINIC | Age: 67
End: 2022-07-22
Payer: COMMERCIAL

## 2022-07-22 ENCOUNTER — LAB OUTSIDE AN ENCOUNTER (OUTPATIENT)
Dept: URBAN - METROPOLITAN AREA CLINIC 25 | Facility: CLINIC | Age: 67
End: 2022-07-22

## 2022-07-22 VITALS
DIASTOLIC BLOOD PRESSURE: 79 MMHG | HEART RATE: 76 BPM | WEIGHT: 246 LBS | TEMPERATURE: 97.7 F | BODY MASS INDEX: 43.59 KG/M2 | HEIGHT: 63 IN | SYSTOLIC BLOOD PRESSURE: 116 MMHG

## 2022-07-22 DIAGNOSIS — Z86.010 PERSONAL HISTORY OF COLONIC POLYPS: ICD-10-CM

## 2022-07-22 DIAGNOSIS — D3A.8 BENIGN NEUROENDOCRINE TUMOR OF DUODENUM: ICD-10-CM

## 2022-07-22 DIAGNOSIS — K86.9 PANCREATIC LESION: ICD-10-CM

## 2022-07-22 DIAGNOSIS — K31.89 INTESTINAL METAPLASIA OF GASTRIC MUCOSA: ICD-10-CM

## 2022-07-22 DIAGNOSIS — Z90.49 HISTORY OF CHOLECYSTECTOMY: ICD-10-CM

## 2022-07-22 DIAGNOSIS — K58.0 IRRITABLE BOWEL SYNDROME WITH DIARRHEA: ICD-10-CM

## 2022-07-22 PROBLEM — 10743008 IRRITABLE BOWEL SYNDROME: Status: ACTIVE | Noted: 2021-05-14

## 2022-07-22 PROCEDURE — 99214 OFFICE O/P EST MOD 30 MIN: CPT | Performed by: INTERNAL MEDICINE

## 2022-07-22 RX ORDER — COLESEVELAM HYDROCHLORIDE 625 MG/1
1-2 TABLETS WITH MEALS AS NEEDED FOR DIARRHEA TABLET, COATED ORAL
Qty: 240 | Refills: 0 | OUTPATIENT
Start: 2022-07-22

## 2022-07-22 RX ORDER — DICYCLOMINE HYDROCHLORIDE 20 MG/1
1 TABLET TABLET ORAL THREE TIMES A DAY
Qty: 270 TABLET | Refills: 2 | Status: ACTIVE | COMMUNITY

## 2022-07-22 RX ORDER — OMEPRAZOLE 40 MG/1
1 CAPSULE 30 MINUTES BEFORE MORNING MEAL CAPSULE, DELAYED RELEASE ORAL ONCE A DAY
Status: ACTIVE | COMMUNITY

## 2022-07-22 NOTE — HPI-OTHER HISTORIES
January 2022 visit:  Language line  was offered and used.  A colonoscopy was performed in October 2021 which revealed an adequate prep, normal TI, biopsies were obtained from throughout the colon as well as TI to evaluate for chronic diarrhea, small ascending colon polyp removed: Hyperplastic polyp, another 8 mm sigmoid colon polyp removed: Tubular adenoma, left-sided diverticulosis, repeat colonoscopy advised in 2024.  No endoscopic colitis on path. MRI abdomen with and without contrast in July 2021 did not reveal any suspicious pancreatic lesion, normal liver, absent gallbladder, no dilation of the pancreatic duct present, a small area of focal fat seen in the inferior aspect of the pancreatic body, mild adrenal gland hyperplasia.   Diarrhea resolved. takes loperamide prn. takes sandostatin 1 per month. regular po intake no abdominal pain.  September 2021 visit:  Language line  was offered and used.  Patient underwent a MRI of the abdomen with and without contrast in July 2021 which revealed a normal-appearing liver, absent gallbladder, 4 mm lesion along the inferior aspect of the pancreatic body most likely reflecting a small area of focal fat, mild adrenal hyperplasia, scattered kidney cysts, subcentimeter abdominal lymph nodes, no suspicious pancreatic lesion identified. Labs from September 2021 revealed a hemoglobin of 11.8, normal WBC, normal platelet count, creatinine 1.4, normal LFTs except slightly elevated alkaline phosphatase of 128, ferritin 19. Patient underwent a EGD in June 2021 which revealed mild gastritis, patchy erythematous and whitish appearing specks in the gastric antrum which were biopsied, no small bowel lesion up to second portion of duodenum, biopsies from duodenal bulb revealed peptic duodenitis, gastric biopsies revealed foveolar hyperplasia and focal intestinal metaplasia, small bowel biopsy did not reveal any celiac disease.  Patient did have a colonoscopy in December 2020 which revealed a 1.5 cm distal ascending colon polyp that was removed piecemeal and therefore a repeat colonoscopy was advised in 6 months, diverticulosis was seen in the sigmoid colon.   Diarrhea 3-4 times per week. on lomotil / another capsule - cant recall name. pt seeing dr nathan. on monthly sandostatin. normal po intake. no nausea. not on tpn.   May 2021 visit:  Patient was seen in March 2021 for nausea vomiting and diarrhea.  Recovered from COVID-19 infection in January 2021.  Was found to have acute renal failure needing dialysis.  Was also admitted to the ICU for septic shock.  Patient had admission to [facility] in February 2021 and was diagnosed with viral gastroenteritis, a CT scan there had revealed a 6 mm hypoattenuating pancreatic tail lesion with a small site that IPMN.  Colonoscopy in December 2020 with Dr. Gutiérrez revealed distal ascending colon tubular adenoma and sigmoid diverticulosis.  Stool tests were negative for C. difficile.  CT abdomen pelvis without IV contrast revealed no acute findings in March 2021.  LFT abnormalities were felt to be secondary to shock.  Patient did have blood culture positive for gram-negative rods and was treated with antibiotics.  EGD March 2021 revealed a normal esophagus, small gastric polyp which was removed:fundic gland polyp, anterior wall duodenal polypoid lesion measuring 5 mm in size which was removed with cold biopsy forceps:well-differentiated neuroendocrine tumor CT chest abdomen and pelvis with contrast in April 2021 revealed indeterminate few soft tissue nodular densities in the ventral pelvic soft tissue largest measuring 2.7 x 2.2 cm, mildly enlarged spleen, mildly prominent endometrium, IR guided biopsy of abdominal nodules revealed extensive fat necrosis with foreign body giant cell reaction with no malignancy identified. Hepatitis B serology negative, hepatitis C antibody negative.  Elevated chromogranin A level normal serotonin, VIP, gastrin, urine HIAA level was 4.4  and repeated again 3.9.   at present no  vomiting, diarrhea. mild nausea.  Not on diaylsis at present. good appetite. but on tpn. normal gastric emptying study in march 2021. She reports intermittent abdominal bloating as well as discomfort in her abdomen just prior to defecation.  She is already taking daily PPI

## 2022-07-22 NOTE — HPI-TODAY'S VISIT:
July 2022: Language line  was offered and used. using somatostatin 1 time per month. using loperamide prn. using bentyl TID prn. on omeprazole 40 mg. diarrhea is not always associated with meals. no rectal bleeding. periodic blackish stools. denies using peptobismol.

## 2022-07-26 ENCOUNTER — OFFICE VISIT (OUTPATIENT)
Dept: URBAN - METROPOLITAN AREA SURGERY CENTER 20 | Facility: SURGERY CENTER | Age: 67
End: 2022-07-26

## 2022-07-26 ENCOUNTER — TELEPHONE ENCOUNTER (OUTPATIENT)
Dept: URBAN - METROPOLITAN AREA CLINIC 92 | Facility: CLINIC | Age: 67
End: 2022-07-26

## 2022-07-26 ENCOUNTER — CLAIMS CREATED FROM THE CLAIM WINDOW (OUTPATIENT)
Dept: URBAN - METROPOLITAN AREA SURGERY CENTER 20 | Facility: SURGERY CENTER | Age: 67
End: 2022-07-26
Payer: COMMERCIAL

## 2022-07-26 ENCOUNTER — LAB OUTSIDE AN ENCOUNTER (OUTPATIENT)
Dept: URBAN - METROPOLITAN AREA CLINIC 92 | Facility: CLINIC | Age: 67
End: 2022-07-26

## 2022-07-26 DIAGNOSIS — K29.30 CHRONIC SUPERFICIAL GASTRITIS: ICD-10-CM

## 2022-07-26 PROCEDURE — G8907 PT DOC NO EVENTS ON DISCHARG: HCPCS | Performed by: INTERNAL MEDICINE

## 2022-07-26 PROCEDURE — 43239 EGD BIOPSY SINGLE/MULTIPLE: CPT | Performed by: INTERNAL MEDICINE

## 2022-07-26 RX ORDER — OMEPRAZOLE 40 MG/1
1 CAPSULE 30 MINUTES BEFORE MORNING MEAL CAPSULE, DELAYED RELEASE ORAL ONCE A DAY
Status: ACTIVE | COMMUNITY

## 2022-07-26 RX ORDER — DICYCLOMINE HYDROCHLORIDE 20 MG/1
1 TABLET TABLET ORAL THREE TIMES A DAY
Qty: 270 TABLET | Refills: 2 | Status: ACTIVE | COMMUNITY

## 2022-07-26 RX ORDER — COLESEVELAM HYDROCHLORIDE 625 MG/1
1-2 TABLETS WITH MEALS AS NEEDED FOR DIARRHEA TABLET, COATED ORAL
Qty: 240 | Refills: 0 | Status: ACTIVE | COMMUNITY
Start: 2022-07-22

## 2022-08-09 ENCOUNTER — TELEPHONE ENCOUNTER (OUTPATIENT)
Dept: URBAN - METROPOLITAN AREA CLINIC 92 | Facility: CLINIC | Age: 67
End: 2022-08-09

## 2022-08-09 ENCOUNTER — LAB OUTSIDE AN ENCOUNTER (OUTPATIENT)
Dept: URBAN - METROPOLITAN AREA CLINIC 92 | Facility: CLINIC | Age: 67
End: 2022-08-09

## 2022-08-09 ENCOUNTER — OFFICE VISIT (OUTPATIENT)
Dept: URBAN - METROPOLITAN AREA SURGERY CENTER 20 | Facility: SURGERY CENTER | Age: 67
End: 2022-08-09
Payer: COMMERCIAL

## 2022-08-09 DIAGNOSIS — Z87.19 ESOPHAGEAL FOOD BOLUS: ICD-10-CM

## 2022-08-09 DIAGNOSIS — K31.84 GASTROPARESIS: ICD-10-CM

## 2022-08-09 PROCEDURE — 43235 EGD DIAGNOSTIC BRUSH WASH: CPT | Performed by: INTERNAL MEDICINE

## 2022-08-09 PROCEDURE — G8907 PT DOC NO EVENTS ON DISCHARG: HCPCS | Performed by: INTERNAL MEDICINE

## 2022-08-09 RX ORDER — DICYCLOMINE HYDROCHLORIDE 20 MG/1
1 TABLET TABLET ORAL THREE TIMES A DAY
Qty: 270 TABLET | Refills: 2 | Status: ACTIVE | COMMUNITY

## 2022-08-09 RX ORDER — COLESEVELAM HYDROCHLORIDE 625 MG/1
1-2 TABLETS WITH MEALS AS NEEDED FOR DIARRHEA TABLET, COATED ORAL
Qty: 240 | Refills: 0 | Status: ACTIVE | COMMUNITY
Start: 2022-07-22

## 2022-08-09 RX ORDER — OMEPRAZOLE 40 MG/1
1 CAPSULE 30 MINUTES BEFORE MORNING MEAL CAPSULE, DELAYED RELEASE ORAL ONCE A DAY
Status: ACTIVE | COMMUNITY

## 2022-08-18 ENCOUNTER — OFFICE VISIT (OUTPATIENT)
Dept: URBAN - METROPOLITAN AREA SURGERY CENTER 20 | Facility: SURGERY CENTER | Age: 67
End: 2022-08-18

## 2022-08-23 ENCOUNTER — OFFICE VISIT (OUTPATIENT)
Dept: URBAN - METROPOLITAN AREA SURGERY CENTER 20 | Facility: SURGERY CENTER | Age: 67
End: 2022-08-23
Payer: COMMERCIAL

## 2022-08-23 DIAGNOSIS — K29.30 CHRONIC SUPERFICIAL GASTRITIS: ICD-10-CM

## 2022-08-23 DIAGNOSIS — K31.7 BENIGN GASTRIC POLYP: ICD-10-CM

## 2022-08-23 PROCEDURE — 43239 EGD BIOPSY SINGLE/MULTIPLE: CPT | Performed by: INTERNAL MEDICINE

## 2022-08-23 PROCEDURE — G8907 PT DOC NO EVENTS ON DISCHARG: HCPCS | Performed by: INTERNAL MEDICINE

## 2022-09-19 ENCOUNTER — WEB ENCOUNTER (OUTPATIENT)
Dept: URBAN - METROPOLITAN AREA CLINIC 25 | Facility: CLINIC | Age: 67
End: 2022-09-19

## 2022-09-26 ENCOUNTER — TELEPHONE ENCOUNTER (OUTPATIENT)
Dept: URBAN - METROPOLITAN AREA CLINIC 92 | Facility: CLINIC | Age: 67
End: 2022-09-26

## 2022-10-31 ENCOUNTER — OFFICE VISIT (OUTPATIENT)
Dept: URBAN - METROPOLITAN AREA CLINIC 25 | Facility: CLINIC | Age: 67
End: 2022-10-31
Payer: COMMERCIAL

## 2022-10-31 ENCOUNTER — WEB ENCOUNTER (OUTPATIENT)
Dept: URBAN - METROPOLITAN AREA CLINIC 25 | Facility: CLINIC | Age: 67
End: 2022-10-31

## 2022-10-31 ENCOUNTER — LAB OUTSIDE AN ENCOUNTER (OUTPATIENT)
Dept: URBAN - METROPOLITAN AREA CLINIC 25 | Facility: CLINIC | Age: 67
End: 2022-10-31

## 2022-10-31 VITALS
TEMPERATURE: 97.9 F | HEIGHT: 63 IN | HEART RATE: 70 BPM | WEIGHT: 244 LBS | DIASTOLIC BLOOD PRESSURE: 85 MMHG | SYSTOLIC BLOOD PRESSURE: 149 MMHG | BODY MASS INDEX: 43.23 KG/M2

## 2022-10-31 DIAGNOSIS — K86.9 PANCREATIC LESION: ICD-10-CM

## 2022-10-31 DIAGNOSIS — Z90.49 HISTORY OF CHOLECYSTECTOMY: ICD-10-CM

## 2022-10-31 DIAGNOSIS — K58.0 IRRITABLE BOWEL SYNDROME WITH DIARRHEA: ICD-10-CM

## 2022-10-31 DIAGNOSIS — D3A.8 BENIGN NEUROENDOCRINE TUMOR OF DUODENUM: ICD-10-CM

## 2022-10-31 DIAGNOSIS — K31.89 INTESTINAL METAPLASIA OF GASTRIC MUCOSA: ICD-10-CM

## 2022-10-31 DIAGNOSIS — R10.9 RIGHT SIDED ABDOMINAL PAIN: ICD-10-CM

## 2022-10-31 DIAGNOSIS — K31.84 GASTROPARESIS: ICD-10-CM

## 2022-10-31 DIAGNOSIS — Z86.010 PERSONAL HISTORY OF COLONIC POLYPS: ICD-10-CM

## 2022-10-31 PROCEDURE — 99213 OFFICE O/P EST LOW 20 MIN: CPT | Performed by: INTERNAL MEDICINE

## 2022-10-31 RX ORDER — DICYCLOMINE HYDROCHLORIDE 20 MG/1
1 TABLET TABLET ORAL THREE TIMES A DAY
Qty: 270 TABLET | Refills: 2 | Status: ON HOLD | COMMUNITY

## 2022-10-31 RX ORDER — OMEPRAZOLE 40 MG/1
1 CAPSULE 30 MINUTES BEFORE MORNING MEAL CAPSULE, DELAYED RELEASE ORAL ONCE A DAY
Status: ACTIVE | COMMUNITY

## 2022-10-31 RX ORDER — COLESEVELAM HYDROCHLORIDE 625 MG/1
1-2 TABLETS WITH MEALS AS NEEDED FOR DIARRHEA TABLET, COATED ORAL
Qty: 240 | Refills: 0 | Status: ON HOLD | COMMUNITY
Start: 2022-07-22

## 2022-10-31 NOTE — HPI-TODAY'S VISIT:
October 22 visit: Language line  was offered and used.  We attempted EGD in July 22 which was aborted because of presence of food in the stomach, brief exam revealed no endoscopic abnormalities and gastric biopsy did not reveal any H. pylori or intestinal metaplasia.  We repeated a EGD in August which once again was aborted because of presence of food.  A third EGD was performed again in August 22 which revealed 2 small gastric body polyps, no visible endoscopic abnormalities otherwise in the esophagus or duodenum, gastric biopsy did not reveal any intestinal metaplasia or H. pylori and both gastric body polyps are benign fundic gland polyps.  Diarrhea is intermittent. better with loperamide. not on narcotics. DM on oral meds.  Rt sided abdominal discomfort X 1 yr. intermittent symptoms. hx cholecystectomy. Pt reports CT scan done by dr nathan within past 2 months was normal. we dont have this report - it was done in her office.

## 2022-10-31 NOTE — HPI-OTHER HISTORIES
July 2022: Language line  was offered and used. using somatostatin 1 time per month. using loperamide prn. using bentyl TID prn. on omeprazole 40 mg. diarrhea is not always associated with meals. no rectal bleeding. periodic blackish stools. denies using peptobismol.  January 2022 visit:  Language line  was offered and used.  A colonoscopy was performed in October 2021 which revealed an adequate prep, normal TI, biopsies were obtained from throughout the colon as well as TI to evaluate for chronic diarrhea, small ascending colon polyp removed: Hyperplastic polyp, another 8 mm sigmoid colon polyp removed: Tubular adenoma, left-sided diverticulosis, repeat colonoscopy advised in 2024.  No endoscopic colitis on path. MRI abdomen with and without contrast in July 2021 did not reveal any suspicious pancreatic lesion, normal liver, absent gallbladder, no dilation of the pancreatic duct present, a small area of focal fat seen in the inferior aspect of the pancreatic body, mild adrenal gland hyperplasia.   Diarrhea resolved. takes loperamide prn. takes sandostatin 1 per month. regular po intake no abdominal pain.  September 2021 visit:  Language line  was offered and used.  Patient underwent a MRI of the abdomen with and without contrast in July 2021 which revealed a normal-appearing liver, absent gallbladder, 4 mm lesion along the inferior aspect of the pancreatic body most likely reflecting a small area of focal fat, mild adrenal hyperplasia, scattered kidney cysts, subcentimeter abdominal lymph nodes, no suspicious pancreatic lesion identified. Labs from September 2021 revealed a hemoglobin of 11.8, normal WBC, normal platelet count, creatinine 1.4, normal LFTs except slightly elevated alkaline phosphatase of 128, ferritin 19. Patient underwent a EGD in June 2021 which revealed mild gastritis, patchy erythematous and whitish appearing specks in the gastric antrum which were biopsied, no small bowel lesion up to second portion of duodenum, biopsies from duodenal bulb revealed peptic duodenitis, gastric biopsies revealed foveolar hyperplasia and focal intestinal metaplasia, small bowel biopsy did not reveal any celiac disease.  Patient did have a colonoscopy in December 2020 which revealed a 1.5 cm distal ascending colon polyp that was removed piecemeal and therefore a repeat colonoscopy was advised in 6 months, diverticulosis was seen in the sigmoid colon.   Diarrhea 3-4 times per week. on lomotil / another capsule - cant recall name. pt seeing dr srinivasiah. on monthly sandostatin. normal po intake. no nausea. not on tpn.   May 2021 visit:  Patient was seen in March 2021 for nausea vomiting and diarrhea.  Recovered from COVID-19 infection in January 2021.  Was found to have acute renal failure needing dialysis.  Was also admitted to the ICU for septic shock.  Patient had admission to [facility] in February 2021 and was diagnosed with viral gastroenteritis, a CT scan there had revealed a 6 mm hypoattenuating pancreatic tail lesion with a small site that IPMN.  Colonoscopy in December 2020 with Dr. Gutiérrez revealed distal ascending colon tubular adenoma and sigmoid diverticulosis.  Stool tests were negative for C. difficile.  CT abdomen pelvis without IV contrast revealed no acute findings in March 2021.  LFT abnormalities were felt to be secondary to shock.  Patient did have blood culture positive for gram-negative rods and was treated with antibiotics.  EGD March 2021 revealed a normal esophagus, small gastric polyp which was removed:fundic gland polyp, anterior wall duodenal polypoid lesion measuring 5 mm in size which was removed with cold biopsy forceps:well-differentiated neuroendocrine tumor CT chest abdomen and pelvis with contrast in April 2021 revealed indeterminate few soft tissue nodular densities in the ventral pelvic soft tissue largest measuring 2.7 x 2.2 cm, mildly enlarged spleen, mildly prominent endometrium, IR guided biopsy of abdominal nodules revealed extensive fat necrosis with foreign body giant cell reaction with no malignancy identified. Hepatitis B serology negative, hepatitis C antibody negative.  Elevated chromogranin A level normal serotonin, VIP, gastrin, urine HIAA level was 4.4  and repeated again 3.9.   at present no  vomiting, diarrhea. mild nausea.  Not on diaylsis at present. good appetite. but on tpn. normal gastric emptying study in march 2021. She reports intermittent abdominal bloating as well as discomfort in her abdomen just prior to defecation.  She is already taking daily PPI

## 2022-11-01 ENCOUNTER — LAB OUTSIDE AN ENCOUNTER (OUTPATIENT)
Dept: URBAN - METROPOLITAN AREA CLINIC 25 | Facility: CLINIC | Age: 67
End: 2022-11-01

## 2023-02-20 ENCOUNTER — OFFICE VISIT (OUTPATIENT)
Dept: URBAN - METROPOLITAN AREA CLINIC 25 | Facility: CLINIC | Age: 68
End: 2023-02-20
Payer: COMMERCIAL

## 2023-02-20 VITALS
DIASTOLIC BLOOD PRESSURE: 79 MMHG | TEMPERATURE: 97.5 F | HEIGHT: 63 IN | HEART RATE: 66 BPM | BODY MASS INDEX: 45.89 KG/M2 | SYSTOLIC BLOOD PRESSURE: 125 MMHG | WEIGHT: 259 LBS

## 2023-02-20 DIAGNOSIS — K31.84 GASTROPARESIS: ICD-10-CM

## 2023-02-20 DIAGNOSIS — Z90.49 HISTORY OF CHOLECYSTECTOMY: ICD-10-CM

## 2023-02-20 DIAGNOSIS — Z86.010 PERSONAL HISTORY OF COLONIC POLYPS: ICD-10-CM

## 2023-02-20 DIAGNOSIS — K86.9 PANCREATIC LESION: ICD-10-CM

## 2023-02-20 DIAGNOSIS — K52.9 CHRONIC DIARRHEA: ICD-10-CM

## 2023-02-20 DIAGNOSIS — K58.0 IRRITABLE BOWEL SYNDROME WITH DIARRHEA: ICD-10-CM

## 2023-02-20 DIAGNOSIS — K31.89 INTESTINAL METAPLASIA OF GASTRIC MUCOSA: ICD-10-CM

## 2023-02-20 DIAGNOSIS — R10.9 RIGHT SIDED ABDOMINAL PAIN: ICD-10-CM

## 2023-02-20 DIAGNOSIS — D3A.8 BENIGN NEUROENDOCRINE TUMOR OF DUODENUM: ICD-10-CM

## 2023-02-20 PROBLEM — 428283002: Status: ACTIVE | Noted: 2020-11-24

## 2023-02-20 PROBLEM — 197125005: Status: ACTIVE | Noted: 2022-07-22

## 2023-02-20 PROBLEM — 428882003 HISTORY OF CHOLECYSTECTOMY: Status: ACTIVE | Noted: 2021-09-27

## 2023-02-20 PROBLEM — 235675006 GASTROPARESIS: Status: ACTIVE | Noted: 2022-08-17

## 2023-02-20 PROCEDURE — 99214 OFFICE O/P EST MOD 30 MIN: CPT | Performed by: INTERNAL MEDICINE

## 2023-02-20 RX ORDER — DICYCLOMINE HYDROCHLORIDE 20 MG/1
1 TABLET TABLET ORAL THREE TIMES A DAY
Qty: 270 TABLET | Refills: 2 | Status: ON HOLD | COMMUNITY

## 2023-02-20 RX ORDER — OMEPRAZOLE 40 MG/1
1 CAPSULE 30 MINUTES BEFORE MORNING MEAL CAPSULE, DELAYED RELEASE ORAL ONCE A DAY
Status: ACTIVE | COMMUNITY

## 2023-02-20 RX ORDER — COLESEVELAM HYDROCHLORIDE 625 MG/1
1-2 TABLETS WITH MEALS AS NEEDED FOR DIARRHEA TABLET, COATED ORAL
Qty: 240 | Refills: 0 | Status: ON HOLD | COMMUNITY
Start: 2022-07-22

## 2023-02-20 RX ORDER — COLESEVELAM HYDROCHLORIDE 625 MG/1
1-2 TABLETS WITH MEALS TABLET, COATED ORAL
Qty: 180 | Refills: 3 | OUTPATIENT
Start: 2023-02-20

## 2023-02-20 NOTE — HPI-TODAY'S VISIT:
February 2023 visit: Right upper quadrant ultrasound in November 22 revealed normal liver, patent portal vein, normal bile duct, absent gallbladder, 1.4 cm right kidney cyst Scheduled to have PET CT Dotatate by oncology.  Continues to have intermittent sporadin diarrhea and takes loperamide prn. reports abdominal bloating and nausea. reports DM is doing good. forgot to get list of meds.

## 2023-02-20 NOTE — HPI-OTHER HISTORIES
October 22 visit: Language line  was offered and used.  We attempted EGD in July 22 which was aborted because of presence of food in the stomach, brief exam revealed no endoscopic abnormalities and gastric biopsy did not reveal any H. pylori or intestinal metaplasia.  We repeated a EGD in August which once again was aborted because of presence of food.  A third EGD was performed again in August 22 which revealed 2 small gastric body polyps, no visible endoscopic abnormalities otherwise in the esophagus or duodenum, gastric biopsy did not reveal any intestinal metaplasia or H. pylori and both gastric body polyps are benign fundic gland polyps.  Diarrhea is intermittent. better with loperamide. not on narcotics. DM on oral meds.  Rt sided abdominal discomfort X 1 yr. intermittent symptoms. hx cholecystectomy. Pt reports CT scan done by dr nathan within past 2 months was normal. we dont have this report - it was done in her office.  July 2022: Language line  was offered and used. using somatostatin 1 time per month. using loperamide prn. using bentyl TID prn. on omeprazole 40 mg. diarrhea is not always associated with meals. no rectal bleeding. periodic blackish stools. denies using peptobismol.  January 2022 visit:  Language line  was offered and used.  A colonoscopy was performed in October 2021 which revealed an adequate prep, normal TI, biopsies were obtained from throughout the colon as well as TI to evaluate for chronic diarrhea, small ascending colon polyp removed: Hyperplastic polyp, another 8 mm sigmoid colon polyp removed: Tubular adenoma, left-sided diverticulosis, repeat colonoscopy advised in 2024.  No endoscopic colitis on path. MRI abdomen with and without contrast in July 2021 did not reveal any suspicious pancreatic lesion, normal liver, absent gallbladder, no dilation of the pancreatic duct present, a small area of focal fat seen in the inferior aspect of the pancreatic body, mild adrenal gland hyperplasia.   Diarrhea resolved. takes loperamide prn. takes sandostatin 1 per month. regular po intake no abdominal pain.  September 2021 visit:  Language line  was offered and used.  Patient underwent a MRI of the abdomen with and without contrast in July 2021 which revealed a normal-appearing liver, absent gallbladder, 4 mm lesion along the inferior aspect of the pancreatic body most likely reflecting a small area of focal fat, mild adrenal hyperplasia, scattered kidney cysts, subcentimeter abdominal lymph nodes, no suspicious pancreatic lesion identified. Labs from September 2021 revealed a hemoglobin of 11.8, normal WBC, normal platelet count, creatinine 1.4, normal LFTs except slightly elevated alkaline phosphatase of 128, ferritin 19. Patient underwent a EGD in June 2021 which revealed mild gastritis, patchy erythematous and whitish appearing specks in the gastric antrum which were biopsied, no small bowel lesion up to second portion of duodenum, biopsies from duodenal bulb revealed peptic duodenitis, gastric biopsies revealed foveolar hyperplasia and focal intestinal metaplasia, small bowel biopsy did not reveal any celiac disease.  Patient did have a colonoscopy in December 2020 which revealed a 1.5 cm distal ascending colon polyp that was removed piecemeal and therefore a repeat colonoscopy was advised in 6 months, diverticulosis was seen in the sigmoid colon.   Diarrhea 3-4 times per week. on lomotil / another capsule - cant recall name. pt seeing dr nathan. on monthly sandostatin. normal po intake. no nausea. not on tpn.   May 2021 visit:  Patient was seen in March 2021 for nausea vomiting and diarrhea.  Recovered from COVID-19 infection in January 2021.  Was found to have acute renal failure needing dialysis.  Was also admitted to the ICU for septic shock.  Patient had admission to [facility] in February 2021 and was diagnosed with viral gastroenteritis, a CT scan there had revealed a 6 mm hypoattenuating pancreatic tail lesion with a small site that IPMN.  Colonoscopy in December 2020 with Dr. Gutiérrez revealed distal ascending colon tubular adenoma and sigmoid diverticulosis.  Stool tests were negative for C. difficile.  CT abdomen pelvis without IV contrast revealed no acute findings in March 2021.  LFT abnormalities were felt to be secondary to shock.  Patient did have blood culture positive for gram-negative rods and was treated with antibiotics.  EGD March 2021 revealed a normal esophagus, small gastric polyp which was removed:fundic gland polyp, anterior wall duodenal polypoid lesion measuring 5 mm in size which was removed with cold biopsy forceps:well-differentiated neuroendocrine tumor CT chest abdomen and pelvis with contrast in April 2021 revealed indeterminate few soft tissue nodular densities in the ventral pelvic soft tissue largest measuring 2.7 x 2.2 cm, mildly enlarged spleen, mildly prominent endometrium, IR guided biopsy of abdominal nodules revealed extensive fat necrosis with foreign body giant cell reaction with no malignancy identified. Hepatitis B serology negative, hepatitis C antibody negative.  Elevated chromogranin A level normal serotonin, VIP, gastrin, urine HIAA level was 4.4  and repeated again 3.9.   at present no  vomiting, diarrhea. mild nausea.  Not on diaylsis at present. good appetite. but on tpn. normal gastric emptying study in march 2021. She reports intermittent abdominal bloating as well as discomfort in her abdomen just prior to defecation.  She is already taking daily PPI

## 2023-05-12 ENCOUNTER — LAB OUTSIDE AN ENCOUNTER (OUTPATIENT)
Dept: URBAN - METROPOLITAN AREA CLINIC 25 | Facility: CLINIC | Age: 68
End: 2023-05-12

## 2023-05-16 ENCOUNTER — TELEPHONE ENCOUNTER (OUTPATIENT)
Dept: URBAN - METROPOLITAN AREA CLINIC 25 | Facility: CLINIC | Age: 68
End: 2023-05-16

## 2023-05-16 LAB
ADENOVIRUS F 40/41: NOT DETECTED
C. DIFFICILE TOXIN A/B, STOOL - QDX: NEGATIVE
CALPROTECTIN, STOOL - QDX: (no result)
CAMPYLOBACTER: NOT DETECTED
CLOSTRIDIUM DIFFICILE: DETECTED
CRYPTOSPORIDIUM: NOT DETECTED
ENTAMOEBA HISTOLYTICA: NOT DETECTED
ENTEROAGGREGATIVE E.COLI: NOT DETECTED
ENTEROTOXIGENIC E.COLI: NOT DETECTED
ESCHERICHIA COLI O157: NOT DETECTED
GIARDIA LAMBLIA: NOT DETECTED
NOROVIRUS GI/GII: NOT DETECTED
PANCREATICELASTASE ELISA, STOOL: (no result)
ROTAVIRUS A: NOT DETECTED
SALMONELLA SPP.: NOT DETECTED
SHIGA-LIKE TOXIN PRODUCING E.COLI: NOT DETECTED
SHIGELLA SPP. / ENTEROINVASIVE E.COLI: NOT DETECTED
VIBRIO PARAHAEMOLYTICUS: NOT DETECTED
VIBRIO SPP.: NOT DETECTED
YERSINIA ENTEROCOLITICA: NOT DETECTED

## 2023-05-22 ENCOUNTER — OFFICE VISIT (OUTPATIENT)
Dept: URBAN - METROPOLITAN AREA CLINIC 25 | Facility: CLINIC | Age: 68
End: 2023-05-22

## 2023-05-22 RX ORDER — COLESEVELAM HYDROCHLORIDE 625 MG/1
1-2 TABLETS WITH MEALS TABLET, COATED ORAL
Qty: 180 | Refills: 3 | OUTPATIENT

## 2023-05-22 RX ORDER — OMEPRAZOLE 40 MG/1
1 CAPSULE 30 MINUTES BEFORE MORNING MEAL CAPSULE, DELAYED RELEASE ORAL ONCE A DAY
Status: ACTIVE | COMMUNITY

## 2023-05-22 RX ORDER — DICYCLOMINE HYDROCHLORIDE 20 MG/1
1 TABLET TABLET ORAL THREE TIMES A DAY
Qty: 270 TABLET | Refills: 2 | Status: ON HOLD | COMMUNITY

## 2023-05-22 RX ORDER — COLESEVELAM HYDROCHLORIDE 625 MG/1
1-2 TABLETS WITH MEALS TABLET, COATED ORAL
Qty: 180 | Refills: 3 | Status: ACTIVE | COMMUNITY
Start: 2023-02-20

## 2023-05-22 RX ORDER — COLESEVELAM HYDROCHLORIDE 625 MG/1
1-2 TABLETS WITH MEALS AS NEEDED FOR DIARRHEA TABLET, COATED ORAL
Qty: 240 | Refills: 0 | Status: ON HOLD | COMMUNITY
Start: 2022-07-22

## 2023-05-22 NOTE — HPI-TODAY'S VISIT:
May 2023 visit: Stool studies in 5/16/2023 revealed a borderline fecal calprotectin of 68.4 with C. difficile detected but toxin A/B not present.  Given symptoms of diarrhea we still prescribed patient a 10-day course of vancomycin.  Fecal elastase level was normal at 231.

## 2023-05-22 NOTE — HPI-OTHER HISTORIES
February 2023 visit: Right upper quadrant ultrasound in November 22 revealed normal liver, patent portal vein, normal bile duct, absent gallbladder, 1.4 cm right kidney cyst Scheduled to have PET CT Dotatate by oncology.  Continues to have intermittent sporadin diarrhea and takes loperamide prn. reports abdominal bloating and nausea. reports DM is doing good. forgot to get list of meds.  October 22 visit: Language line  was offered and used.  We attempted EGD in July 22 which was aborted because of presence of food in the stomach, brief exam revealed no endoscopic abnormalities and gastric biopsy did not reveal any H. pylori or intestinal metaplasia.  We repeated a EGD in August which once again was aborted because of presence of food.  A third EGD was performed again in August 22 which revealed 2 small gastric body polyps, no visible endoscopic abnormalities otherwise in the esophagus or duodenum, gastric biopsy did not reveal any intestinal metaplasia or H. pylori and both gastric body polyps are benign fundic gland polyps.  Diarrhea is intermittent. better with loperamide. not on narcotics. DM on oral meds.  Rt sided abdominal discomfort X 1 yr. intermittent symptoms. hx cholecystectomy. Pt reports CT scan done by dr nathan within past 2 months was normal. we dont have this report - it was done in her office.  July 2022: Language line  was offered and used. using somatostatin 1 time per month. using loperamide prn. using bentyl TID prn. on omeprazole 40 mg. diarrhea is not always associated with meals. no rectal bleeding. periodic blackish stools. denies using peptobismol.  January 2022 visit:  Language line  was offered and used.  A colonoscopy was performed in October 2021 which revealed an adequate prep, normal TI, biopsies were obtained from throughout the colon as well as TI to evaluate for chronic diarrhea, small ascending colon polyp removed: Hyperplastic polyp, another 8 mm sigmoid colon polyp removed: Tubular adenoma, left-sided diverticulosis, repeat colonoscopy advised in 2024.  No endoscopic colitis on path. MRI abdomen with and without contrast in July 2021 did not reveal any suspicious pancreatic lesion, normal liver, absent gallbladder, no dilation of the pancreatic duct present, a small area of focal fat seen in the inferior aspect of the pancreatic body, mild adrenal gland hyperplasia.   Diarrhea resolved. takes loperamide prn. takes sandostatin 1 per month. regular po intake no abdominal pain.  September 2021 visit:  Language line  was offered and used.  Patient underwent a MRI of the abdomen with and without contrast in July 2021 which revealed a normal-appearing liver, absent gallbladder, 4 mm lesion along the inferior aspect of the pancreatic body most likely reflecting a small area of focal fat, mild adrenal hyperplasia, scattered kidney cysts, subcentimeter abdominal lymph nodes, no suspicious pancreatic lesion identified. Labs from September 2021 revealed a hemoglobin of 11.8, normal WBC, normal platelet count, creatinine 1.4, normal LFTs except slightly elevated alkaline phosphatase of 128, ferritin 19. Patient underwent a EGD in June 2021 which revealed mild gastritis, patchy erythematous and whitish appearing specks in the gastric antrum which were biopsied, no small bowel lesion up to second portion of duodenum, biopsies from duodenal bulb revealed peptic duodenitis, gastric biopsies revealed foveolar hyperplasia and focal intestinal metaplasia, small bowel biopsy did not reveal any celiac disease.  Patient did have a colonoscopy in December 2020 which revealed a 1.5 cm distal ascending colon polyp that was removed piecemeal and therefore a repeat colonoscopy was advised in 6 months, diverticulosis was seen in the sigmoid colon.   Diarrhea 3-4 times per week. on lomotil / another capsule - cant recall name. pt seeing dr nathan. on monthly sandostatin. normal po intake. no nausea. not on tpn.   May 2021 visit:  Patient was seen in March 2021 for nausea vomiting and diarrhea.  Recovered from COVID-19 infection in January 2021.  Was found to have acute renal failure needing dialysis.  Was also admitted to the ICU for septic shock.  Patient had admission to  in February 2021 and was diagnosed with viral gastroenteritis, a CT scan there had revealed a 6 mm hypoattenuating pancreatic tail lesion with a small site that IPMN.  Colonoscopy in December 2020 with Dr. Gutiérrez revealed distal ascending colon tubular adenoma and sigmoid diverticulosis.  Stool tests were negative for C. difficile.  CT abdomen pelvis without IV contrast revealed no acute findings in March 2021.  LFT abnormalities were felt to be secondary to shock.  Patient did have blood culture positive for gram-negative rods and was treated with antibiotics.  EGD March 2021 revealed a normal esophagus, small gastric polyp which was removed:fundic gland polyp, anterior wall duodenal polypoid lesion measuring 5 mm in size which was removed with cold biopsy forceps:well-differentiated neuroendocrine tumor CT chest abdomen and pelvis with contrast in April 2021 revealed indeterminate few soft tissue nodular densities in the ventral pelvic soft tissue largest measuring 2.7 x 2.2 cm, mildly enlarged spleen, mildly prominent endometrium, IR guided biopsy of abdominal nodules revealed extensive fat necrosis with foreign body giant cell reaction with no malignancy identified. Hepatitis B serology negative, hepatitis C antibody negative.  Elevated chromogranin A level normal serotonin, VIP, gastrin, urine HIAA level was 4.4  and repeated again 3.9.   at present no  vomiting, diarrhea. mild nausea.  Not on diaylsis at present. good appetite. but on tpn. normal gastric emptying study in march 2021. She reports intermittent abdominal bloating as well as discomfort in her abdomen just prior to defecation.  She is already taking daily PPIMay 2023 visit:

## 2023-05-26 ENCOUNTER — TELEPHONE ENCOUNTER (OUTPATIENT)
Dept: URBAN - METROPOLITAN AREA CLINIC 25 | Facility: CLINIC | Age: 68
End: 2023-05-26

## 2023-05-26 RX ORDER — VANCOMYCIN HYDROCHLORIDE 125 MG/1
1 CAPSULE CAPSULE ORAL
Qty: 40 | Refills: 0 | OUTPATIENT
Start: 2023-05-26 | End: 2023-06-05

## 2023-09-20 ENCOUNTER — OFFICE VISIT (OUTPATIENT)
Dept: URBAN - METROPOLITAN AREA CLINIC 25 | Facility: CLINIC | Age: 68
End: 2023-09-20

## 2023-09-22 ENCOUNTER — LAB OUTSIDE AN ENCOUNTER (OUTPATIENT)
Dept: URBAN - METROPOLITAN AREA CLINIC 25 | Facility: CLINIC | Age: 68
End: 2023-09-22

## 2023-09-22 ENCOUNTER — TELEPHONE ENCOUNTER (OUTPATIENT)
Dept: URBAN - METROPOLITAN AREA CLINIC 25 | Facility: CLINIC | Age: 68
End: 2023-09-22

## 2023-09-22 ENCOUNTER — OFFICE VISIT (OUTPATIENT)
Dept: URBAN - METROPOLITAN AREA CLINIC 25 | Facility: CLINIC | Age: 68
End: 2023-09-22
Payer: COMMERCIAL

## 2023-09-22 VITALS
HEIGHT: 63 IN | WEIGHT: 232.8 LBS | BODY MASS INDEX: 41.25 KG/M2 | SYSTOLIC BLOOD PRESSURE: 133 MMHG | TEMPERATURE: 97.8 F | DIASTOLIC BLOOD PRESSURE: 70 MMHG | HEART RATE: 75 BPM

## 2023-09-22 DIAGNOSIS — K86.9 PANCREATIC LESION: ICD-10-CM

## 2023-09-22 DIAGNOSIS — K31.84 GASTROPARESIS: ICD-10-CM

## 2023-09-22 DIAGNOSIS — K58.0 IRRITABLE BOWEL SYNDROME WITH DIARRHEA: ICD-10-CM

## 2023-09-22 DIAGNOSIS — K31.89 INTESTINAL METAPLASIA OF GASTRIC MUCOSA: ICD-10-CM

## 2023-09-22 PROCEDURE — 99214 OFFICE O/P EST MOD 30 MIN: CPT | Performed by: INTERNAL MEDICINE

## 2023-09-22 RX ORDER — COLESEVELAM HYDROCHLORIDE 625 MG/1
1-2 TABLETS WITH MEALS AS NEEDED FOR DIARRHEA TABLET, COATED ORAL
Qty: 240 | Refills: 0 | Status: ON HOLD | COMMUNITY
Start: 2022-07-22

## 2023-09-22 RX ORDER — DICYCLOMINE HYDROCHLORIDE 20 MG/1
1 TABLET TABLET ORAL THREE TIMES A DAY
Qty: 270 TABLET | Refills: 3 | OUTPATIENT
Start: 2023-09-22 | End: 2024-09-16

## 2023-09-22 RX ORDER — DICYCLOMINE HYDROCHLORIDE 20 MG/1
1 TABLET TABLET ORAL THREE TIMES A DAY
Qty: 270 TABLET | Refills: 2 | Status: ON HOLD | COMMUNITY

## 2023-09-22 RX ORDER — COLESEVELAM HYDROCHLORIDE 625 MG/1
1-2 TABLETS WITH MEALS TABLET, COATED ORAL
Qty: 180 | Refills: 3 | Status: ACTIVE | COMMUNITY

## 2023-09-22 RX ORDER — OMEPRAZOLE 40 MG/1
1 CAPSULE 30 MINUTES BEFORE MORNING MEAL CAPSULE, DELAYED RELEASE ORAL ONCE A DAY
Status: ACTIVE | COMMUNITY

## 2023-09-22 NOTE — HPI-OTHER HISTORIES
May 2023 visit: Stool studies in 5/16/2023 revealed a borderline fecal calprotectin of 68.4 with C. difficile detected but toxin A/B not present.  Given symptoms of diarrhea we still prescribed patient a 10-day course of vancomycin.  Fecal elastase level was normal at 231.  February 2023 visit: Right upper quadrant ultrasound in November 22 revealed normal liver, patent portal vein, normal bile duct, absent gallbladder, 1.4 cm right kidney cyst Scheduled to have PET CT Dotatate by oncology.  Continues to have intermittent sporadin diarrhea and takes loperamide prn. reports abdominal bloating and nausea. reports DM is doing good. forgot to get list of meds.  October 22 visit: Language line  was offered and used.  We attempted EGD in July 22 which was aborted because of presence of food in the stomach, brief exam revealed no endoscopic abnormalities and gastric biopsy did not reveal any H. pylori or intestinal metaplasia.  We repeated a EGD in August which once again was aborted because of presence of food.  A third EGD was performed again in August 22 which revealed 2 small gastric body polyps, no visible endoscopic abnormalities otherwise in the esophagus or duodenum, gastric biopsy did not reveal any intestinal metaplasia or H. pylori and both gastric body polyps are benign fundic gland polyps.  Diarrhea is intermittent. better with loperamide. not on narcotics. DM on oral meds.  Rt sided abdominal discomfort X 1 yr. intermittent symptoms. hx cholecystectomy. Pt reports CT scan done by dr nathan within past 2 months was normal. we dont have this report - it was done in her office.  July 2022: Language line  was offered and used. using somatostatin 1 time per month. using loperamide prn. using bentyl TID prn. on omeprazole 40 mg. diarrhea is not always associated with meals. no rectal bleeding. periodic blackish stools. denies using peptobismol.  January 2022 visit:  Language line  was offered and used.  A colonoscopy was performed in October 2021 which revealed an adequate prep, normal TI, biopsies were obtained from throughout the colon as well as TI to evaluate for chronic diarrhea, small ascending colon polyp removed: Hyperplastic polyp, another 8 mm sigmoid colon polyp removed: Tubular adenoma, left-sided diverticulosis, repeat colonoscopy advised in 2024.  No endoscopic colitis on path. MRI abdomen with and without contrast in July 2021 did not reveal any suspicious pancreatic lesion, normal liver, absent gallbladder, no dilation of the pancreatic duct present, a small area of focal fat seen in the inferior aspect of the pancreatic body, mild adrenal gland hyperplasia.   Diarrhea resolved. takes loperamide prn. takes sandostatin 1 per month. regular po intake no abdominal pain.  September 2021 visit:  Language line  was offered and used.  Patient underwent a MRI of the abdomen with and without contrast in July 2021 which revealed a normal-appearing liver, absent gallbladder, 4 mm lesion along the inferior aspect of the pancreatic body most likely reflecting a small area of focal fat, mild adrenal hyperplasia, scattered kidney cysts, subcentimeter abdominal lymph nodes, no suspicious pancreatic lesion identified. Labs from September 2021 revealed a hemoglobin of 11.8, normal WBC, normal platelet count, creatinine 1.4, normal LFTs except slightly elevated alkaline phosphatase of 128, ferritin 19. Patient underwent a EGD in June 2021 which revealed mild gastritis, patchy erythematous and whitish appearing specks in the gastric antrum which were biopsied, no small bowel lesion up to second portion of duodenum, biopsies from duodenal bulb revealed peptic duodenitis, gastric biopsies revealed foveolar hyperplasia and focal intestinal metaplasia, small bowel biopsy did not reveal any celiac disease.  Patient did have a colonoscopy in December 2020 which revealed a 1.5 cm distal ascending colon polyp that was removed piecemeal and therefore a repeat colonoscopy was advised in 6 months, diverticulosis was seen in the sigmoid colon.   Diarrhea 3-4 times per week. on lomotil / another capsule - cant recall name. pt seeing dr nathan. on monthly sandostatin. normal po intake. no nausea. not on tpn.   May 2021 visit:  Patient was seen in March 2021 for nausea vomiting and diarrhea.  Recovered from COVID-19 infection in January 2021.  Was found to have acute renal failure needing dialysis.  Was also admitted to the ICU for septic shock.  Patient had admission to [facility] in February 2021 and was diagnosed with viral gastroenteritis, a CT scan there had revealed a 6 mm hypoattenuating pancreatic tail lesion with a small site that IPMN.  Colonoscopy in December 2020 with Dr. Gutiérrez revealed distal ascending colon tubular adenoma and sigmoid diverticulosis.  Stool tests were negative for C. difficile.  CT abdomen pelvis without IV contrast revealed no acute findings in March 2021.  LFT abnormalities were felt to be secondary to shock.  Patient did have blood culture positive for gram-negative rods and was treated with antibiotics.  EGD March 2021 revealed a normal esophagus, small gastric polyp which was removed:fundic gland polyp, anterior wall duodenal polypoid lesion measuring 5 mm in size which was removed with cold biopsy forceps:well-differentiated neuroendocrine tumor CT chest abdomen and pelvis with contrast in April 2021 revealed indeterminate few soft tissue nodular densities in the ventral pelvic soft tissue largest measuring 2.7 x 2.2 cm, mildly enlarged spleen, mildly prominent endometrium, IR guided biopsy of abdominal nodules revealed extensive fat necrosis with foreign body giant cell reaction with no malignancy identified. Hepatitis B serology negative, hepatitis C antibody negative.  Elevated chromogranin A level normal serotonin, VIP, gastrin, urine HIAA level was 4.4  and repeated again 3.9.   at present no  vomiting, diarrhea. mild nausea.  Not on diaylsis at present. good appetite. but on tpn. normal gastric emptying study in march 2021. She reports intermittent abdominal bloating as well as discomfort in her abdomen just prior to defecation.  She is already taking daily PPIMay 2023 visit:

## 2023-09-22 NOTE — HPI-TODAY'S VISIT:
September 2023 visit: Language line  was offered and used. Stool studies from May 2023 confirmed borderline elevation in fecal calprotectin of 68 and noted to have C. difficile but no toxin A/B, normal pancreatic elastase.  Patient was treated with vancomycin oral for 10 days. seeing dr nathan, saw her just this week. continue to have diarrhea - improved from before. diarrhea intensity varies . upto 10 times per day. somedays there is no diarrhea. on somatuline every 4 weeks. intermittent diarrhea and constipation. takes loperamide for diarrhea which helps. takes questran powder daily. not using welchol. takes dicylomine BID which helps with abdominal pain.

## 2023-09-26 ENCOUNTER — TELEPHONE ENCOUNTER (OUTPATIENT)
Dept: URBAN - METROPOLITAN AREA CLINIC 25 | Facility: CLINIC | Age: 68
End: 2023-09-26

## 2024-01-08 ENCOUNTER — OFFICE VISIT (OUTPATIENT)
Dept: URBAN - METROPOLITAN AREA CLINIC 25 | Facility: CLINIC | Age: 69
End: 2024-01-08

## 2024-01-22 ENCOUNTER — OFFICE VISIT (OUTPATIENT)
Dept: URBAN - METROPOLITAN AREA CLINIC 25 | Facility: CLINIC | Age: 69
End: 2024-01-22

## 2024-02-05 ENCOUNTER — OV EP (OUTPATIENT)
Dept: URBAN - METROPOLITAN AREA CLINIC 25 | Facility: CLINIC | Age: 69
End: 2024-02-05

## 2024-02-26 ENCOUNTER — OV EP (OUTPATIENT)
Dept: URBAN - METROPOLITAN AREA CLINIC 25 | Facility: CLINIC | Age: 69
End: 2024-02-26

## 2024-03-06 ENCOUNTER — OV EP (OUTPATIENT)
Dept: URBAN - METROPOLITAN AREA CLINIC 25 | Facility: CLINIC | Age: 69
End: 2024-03-06
Payer: COMMERCIAL

## 2024-03-06 VITALS
SYSTOLIC BLOOD PRESSURE: 154 MMHG | BODY MASS INDEX: 41.46 KG/M2 | HEART RATE: 65 BPM | TEMPERATURE: 97.4 F | HEIGHT: 63 IN | DIASTOLIC BLOOD PRESSURE: 76 MMHG | WEIGHT: 234 LBS

## 2024-03-06 DIAGNOSIS — D3A.8 BENIGN NEUROENDOCRINE TUMOR OF DUODENUM: ICD-10-CM

## 2024-03-06 DIAGNOSIS — K59.04 CHRONIC IDIOPATHIC CONSTIPATION: ICD-10-CM

## 2024-03-06 DIAGNOSIS — K86.9 PANCREATIC LESION: ICD-10-CM

## 2024-03-06 DIAGNOSIS — K31.84 GASTROPARESIS: ICD-10-CM

## 2024-03-06 DIAGNOSIS — K31.89 INTESTINAL METAPLASIA OF GASTRIC MUCOSA: ICD-10-CM

## 2024-03-06 DIAGNOSIS — R19.7 OVERFLOW DIARRHEA: ICD-10-CM

## 2024-03-06 DIAGNOSIS — Z86.010 PERSONAL HISTORY OF COLONIC POLYPS: ICD-10-CM

## 2024-03-06 DIAGNOSIS — Z90.49 HISTORY OF CHOLECYSTECTOMY: ICD-10-CM

## 2024-03-06 DIAGNOSIS — R19.8 IRREGULAR BOWEL HABITS: ICD-10-CM

## 2024-03-06 PROBLEM — 82934008: Status: ACTIVE | Noted: 2024-03-06

## 2024-03-06 PROCEDURE — 99214 OFFICE O/P EST MOD 30 MIN: CPT

## 2024-03-06 RX ORDER — POLYETHYLENE GLYCOL 3350, SODIUM SULFATE ANHYDROUS, SODIUM BICARBONATE, SODIUM CHLORIDE, POTASSIUM CHLORIDE 236; 22.74; 6.74; 5.86; 2.97 G/4L; G/4L; G/4L; G/4L; G/4L
AS DIRECTED POWDER, FOR SOLUTION ORAL
Qty: 1 | Refills: 0 | OUTPATIENT
Start: 2024-03-06 | End: 2024-03-07

## 2024-03-06 RX ORDER — COLESEVELAM HYDROCHLORIDE 625 MG/1
1-2 TABLETS WITH MEALS TABLET, COATED ORAL
Qty: 180 | Refills: 3 | Status: ACTIVE | COMMUNITY

## 2024-03-06 RX ORDER — COLESEVELAM HYDROCHLORIDE 625 MG/1
1-2 TABLETS WITH MEALS AS NEEDED FOR DIARRHEA TABLET, COATED ORAL
Qty: 240 | Refills: 0 | Status: ON HOLD | COMMUNITY
Start: 2022-07-22

## 2024-03-06 RX ORDER — DICYCLOMINE HYDROCHLORIDE 20 MG/1
1 TABLET TABLET ORAL THREE TIMES A DAY
Qty: 270 TABLET | Refills: 3 | Status: ACTIVE | COMMUNITY
Start: 2023-09-22 | End: 2024-09-16

## 2024-03-06 RX ORDER — OMEPRAZOLE 40 MG/1
1 CAPSULE 30 MINUTES BEFORE MORNING MEAL CAPSULE, DELAYED RELEASE ORAL ONCE A DAY
Status: ACTIVE | COMMUNITY

## 2024-03-06 RX ORDER — DICYCLOMINE HYDROCHLORIDE 20 MG/1
1 TABLET TABLET ORAL THREE TIMES A DAY
Qty: 270 TABLET | Refills: 2 | Status: ON HOLD | COMMUNITY

## 2024-03-06 NOTE — HPI-OTHER HISTORIES
September 2023 visit: Language line  was offered and used. Stool studies from May 2023 confirmed borderline elevation in fecal calprotectin of 68 and noted to have C. difficile but no toxin A/B, normal pancreatic elastase. Patient was treated with vancomycin oral for 10 days. seeing dr nathan, saw her just this week. continue to have diarrhea - improved from before. diarrhea intensity varies . upto 10 times per day. somedays there is no diarrhea. on somatuline every 4 weeks. intermittent diarrhea and constipation. takes loperamide for diarrhea which helps. takes questran powder daily. not using welchol. takes dicylomine BID which helps with abdominal pain.  May 2023 visit: Stool studies in 5/16/2023 revealed a borderline fecal calprotectin of 68.4 with C. difficile detected but toxin A/B not present.  Given symptoms of diarrhea we still prescribed patient a 10-day course of vancomycin.  Fecal elastase level was normal at 231.  February 2023 visit: Right upper quadrant ultrasound in November 22 revealed normal liver, patent portal vein, normal bile duct, absent gallbladder, 1.4 cm right kidney cyst Scheduled to have PET CT Dotatate by oncology.  Continues to have intermittent sporadin diarrhea and takes loperamide prn. reports abdominal bloating and nausea. reports DM is doing good. forgot to get list of meds.  October 22 visit: Language line  was offered and used.  We attempted EGD in July 22 which was aborted because of presence of food in the stomach, brief exam revealed no endoscopic abnormalities and gastric biopsy did not reveal any H. pylori or intestinal metaplasia.  We repeated a EGD in August which once again was aborted because of presence of food.  A third EGD was performed again in August 22 which revealed 2 small gastric body polyps, no visible endoscopic abnormalities otherwise in the esophagus or duodenum, gastric biopsy did not reveal any intestinal metaplasia or H. pylori and both gastric body polyps are benign fundic gland polyps.  Diarrhea is intermittent. better with loperamide. not on narcotics. DM on oral meds.  Rt sided abdominal discomfort X 1 yr. intermittent symptoms. hx cholecystectomy. Pt reports CT scan done by dr nathan within past 2 months was normal. we dont have this report - it was done in her office.  July 2022: Language line  was offered and used. using somatostatin 1 time per month. using loperamide prn. using bentyl TID prn. on omeprazole 40 mg. diarrhea is not always associated with meals. no rectal bleeding. periodic blackish stools. denies using peptobismol.  January 2022 visit:  Language line  was offered and used.  A colonoscopy was performed in October 2021 which revealed an adequate prep, normal TI, biopsies were obtained from throughout the colon as well as TI to evaluate for chronic diarrhea, small ascending colon polyp removed: Hyperplastic polyp, another 8 mm sigmoid colon polyp removed: Tubular adenoma, left-sided diverticulosis, repeat colonoscopy advised in 2024.  No endoscopic colitis on path. MRI abdomen with and without contrast in July 2021 did not reveal any suspicious pancreatic lesion, normal liver, absent gallbladder, no dilation of the pancreatic duct present, a small area of focal fat seen in the inferior aspect of the pancreatic body, mild adrenal gland hyperplasia.   Diarrhea resolved. takes loperamide prn. takes sandostatin 1 per month. regular po intake no abdominal pain.  September 2021 visit:  Language line  was offered and used.  Patient underwent a MRI of the abdomen with and without contrast in July 2021 which revealed a normal-appearing liver, absent gallbladder, 4 mm lesion along the inferior aspect of the pancreatic body most likely reflecting a small area of focal fat, mild adrenal hyperplasia, scattered kidney cysts, subcentimeter abdominal lymph nodes, no suspicious pancreatic lesion identified. Labs from September 2021 revealed a hemoglobin of 11.8, normal WBC, normal platelet count, creatinine 1.4, normal LFTs except slightly elevated alkaline phosphatase of 128, ferritin 19. Patient underwent a EGD in June 2021 which revealed mild gastritis, patchy erythematous and whitish appearing specks in the gastric antrum which were biopsied, no small bowel lesion up to second portion of duodenum, biopsies from duodenal bulb revealed peptic duodenitis, gastric biopsies revealed foveolar hyperplasia and focal intestinal metaplasia, small bowel biopsy did not reveal any celiac disease.  Patient did have a colonoscopy in December 2020 which revealed a 1.5 cm distal ascending colon polyp that was removed piecemeal and therefore a repeat colonoscopy was advised in 6 months, diverticulosis was seen in the sigmoid colon.   Diarrhea 3-4 times per week. on lomotil / another capsule - cant recall name. pt seeing dr nathan. on monthly sandostatin. normal po intake. no nausea. not on tpn.   May 2021 visit:  Patient was seen in March 2021 for nausea vomiting and diarrhea.  Recovered from COVID-19 infection in January 2021.  Was found to have acute renal failure needing dialysis.  Was also admitted to the ICU for septic shock.  Patient had admission to [facility] in February 2021 and was diagnosed with viral gastroenteritis, a CT scan there had revealed a 6 mm hypoattenuating pancreatic tail lesion with a small site that IPMN.  Colonoscopy in December 2020 with Dr. Gutiérrez revealed distal ascending colon tubular adenoma and sigmoid diverticulosis.  Stool tests were negative for C. difficile.  CT abdomen pelvis without IV contrast revealed no acute findings in March 2021.  LFT abnormalities were felt to be secondary to shock.  Patient did have blood culture positive for gram-negative rods and was treated with antibiotics.  EGD March 2021 revealed a normal esophagus, small gastric polyp which was removed:fundic gland polyp, anterior wall duodenal polypoid lesion measuring 5 mm in size which was removed with cold biopsy forceps:well-differentiated neuroendocrine tumor CT chest abdomen and pelvis with contrast in April 2021 revealed indeterminate few soft tissue nodular densities in the ventral pelvic soft tissue largest measuring 2.7 x 2.2 cm, mildly enlarged spleen, mildly prominent endometrium, IR guided biopsy of abdominal nodules revealed extensive fat necrosis with foreign body giant cell reaction with no malignancy identified. Hepatitis B serology negative, hepatitis C antibody negative.  Elevated chromogranin A level normal serotonin, VIP, gastrin, urine HIAA level was 4.4  and repeated again 3.9.   at present no  vomiting, diarrhea. mild nausea.  Not on diaylsis at present. good appetite. but on tpn. normal gastric emptying study in march 2021. She reports intermittent abdominal bloating as well as discomfort in her abdomen just prior to defecation.  She is already taking daily PPIMay 2023 visit:

## 2024-03-06 NOTE — HPI-TODAY'S VISIT:
03/24 OV  Language line  was offered and used. KUB revealed constipation, and suspected overflow diarrhea, advised the patient to hold all anti-diarrheal medications (lomotil, questran, dicyclomine) and to replace w/ Miralax + Metamucil, since holding medications she notes improvement in her bowel frequency, but continues to have loose stool,  C. diff stool PCR negative, due for colon recall, last PET scan w/ Dr. Mcclain reveals no metastatic disease - Patient admits to hard stools in the morning, she will take dicyclomine prn for abdominal discomfort still, she has been continuing to take loperamide regularly for her frequent bowels  - No appetite changes, deneis unintentional weight loss, emesis (but notes occasional nausea), BRBPR, melena, SOB/CP, notes abdominal pain worse before a BM which resolves after a BM  No known family h/o colon cancer/polyps. Denies cardiac hardware, dialysis, blood thinner use, and or issues with anesthesia

## 2024-04-03 ENCOUNTER — LAB (OUTPATIENT)
Dept: URBAN - METROPOLITAN AREA CLINIC 4 | Facility: CLINIC | Age: 69
End: 2024-04-03
Payer: COMMERCIAL

## 2024-04-03 ENCOUNTER — COLON (OUTPATIENT)
Dept: URBAN - METROPOLITAN AREA SURGERY CENTER 16 | Facility: SURGERY CENTER | Age: 69
End: 2024-04-03
Payer: COMMERCIAL

## 2024-04-03 DIAGNOSIS — K63.89 OTHER SPECIFIED DISEASES OF INTESTINE: ICD-10-CM

## 2024-04-03 DIAGNOSIS — K63.5 BENIGN COLON POLYP: ICD-10-CM

## 2024-04-03 DIAGNOSIS — Z86.010 ADENOMAS PERSONAL HISTORY OF COLONIC POLYPS: ICD-10-CM

## 2024-04-03 PROCEDURE — 88305 TISSUE EXAM BY PATHOLOGIST: CPT | Performed by: PATHOLOGY

## 2024-04-03 PROCEDURE — 45385 COLONOSCOPY W/LESION REMOVAL: CPT | Performed by: INTERNAL MEDICINE

## 2024-04-03 PROCEDURE — 45380 COLONOSCOPY AND BIOPSY: CPT | Performed by: INTERNAL MEDICINE

## 2024-04-03 RX ORDER — COLESEVELAM HYDROCHLORIDE 625 MG/1
1-2 TABLETS WITH MEALS TABLET, COATED ORAL
Qty: 180 | Refills: 3 | Status: ACTIVE | COMMUNITY

## 2024-04-03 RX ORDER — DICYCLOMINE HYDROCHLORIDE 20 MG/1
1 TABLET TABLET ORAL THREE TIMES A DAY
Qty: 270 TABLET | Refills: 3 | Status: ACTIVE | COMMUNITY
Start: 2023-09-22 | End: 2024-09-16

## 2024-04-03 RX ORDER — OMEPRAZOLE 40 MG/1
1 CAPSULE 30 MINUTES BEFORE MORNING MEAL CAPSULE, DELAYED RELEASE ORAL ONCE A DAY
Status: ACTIVE | COMMUNITY

## 2024-04-03 RX ORDER — DICYCLOMINE HYDROCHLORIDE 20 MG/1
1 TABLET TABLET ORAL THREE TIMES A DAY
Qty: 270 TABLET | Refills: 2 | Status: ON HOLD | COMMUNITY

## 2024-04-03 RX ORDER — COLESEVELAM HYDROCHLORIDE 625 MG/1
1-2 TABLETS WITH MEALS AS NEEDED FOR DIARRHEA TABLET, COATED ORAL
Qty: 240 | Refills: 0 | Status: ON HOLD | COMMUNITY
Start: 2022-07-22

## 2024-10-30 ENCOUNTER — OFFICE VISIT (OUTPATIENT)
Dept: URBAN - METROPOLITAN AREA CLINIC 25 | Facility: CLINIC | Age: 69
End: 2024-10-30
Payer: COMMERCIAL

## 2024-10-30 ENCOUNTER — DASHBOARD ENCOUNTERS (OUTPATIENT)
Age: 69
End: 2024-10-30

## 2024-10-30 ENCOUNTER — TELEPHONE ENCOUNTER (OUTPATIENT)
Dept: URBAN - METROPOLITAN AREA CLINIC 25 | Facility: CLINIC | Age: 69
End: 2024-10-30

## 2024-10-30 VITALS
HEART RATE: 69 BPM | WEIGHT: 246.8 LBS | HEIGHT: 63 IN | TEMPERATURE: 97 F | SYSTOLIC BLOOD PRESSURE: 139 MMHG | BODY MASS INDEX: 43.73 KG/M2 | DIASTOLIC BLOOD PRESSURE: 79 MMHG

## 2024-10-30 DIAGNOSIS — R11.0 NAUSEA: ICD-10-CM

## 2024-10-30 DIAGNOSIS — R15.9 FULL INCONTINENCE OF FECES: ICD-10-CM

## 2024-10-30 DIAGNOSIS — K52.9 CHRONIC DIARRHEA: ICD-10-CM

## 2024-10-30 DIAGNOSIS — K58.0 IRRITABLE BOWEL SYNDROME WITH DIARRHEA: ICD-10-CM

## 2024-10-30 DIAGNOSIS — D3A.8 BENIGN NEUROENDOCRINE TUMOR OF DUODENUM: ICD-10-CM

## 2024-10-30 DIAGNOSIS — K59.1 FUNCTIONAL DIARRHEA: ICD-10-CM

## 2024-10-30 PROBLEM — 1086911000119107: Status: ACTIVE | Noted: 2024-10-30

## 2024-10-30 PROCEDURE — 99214 OFFICE O/P EST MOD 30 MIN: CPT

## 2024-10-30 RX ORDER — COLESEVELAM HYDROCHLORIDE 625 MG/1
1-2 TABLETS WITH MEALS TABLET, COATED ORAL
Qty: 180 | Refills: 3 | Status: ACTIVE | COMMUNITY

## 2024-10-30 RX ORDER — ONDANSETRON 4 MG/1
1 TABLET ON THE TONGUE AND ALLOW TO DISSOLVE FOR NAUSEA TABLET, ORALLY DISINTEGRATING ORAL ONCE A DAY
Qty: 7 TABLET | Refills: 0 | OUTPATIENT
Start: 2024-10-30

## 2024-10-30 RX ORDER — COLESEVELAM HYDROCHLORIDE 625 MG/1
1-2 TABLETS WITH MEALS AS NEEDED FOR DIARRHEA TABLET, COATED ORAL
Qty: 240 | Refills: 0 | Status: ON HOLD | COMMUNITY
Start: 2022-07-22

## 2024-10-30 RX ORDER — DICYCLOMINE HYDROCHLORIDE 20 MG/1
1 TABLET TABLET ORAL THREE TIMES A DAY
Qty: 270 TABLET | Refills: 2 | Status: ON HOLD | COMMUNITY

## 2024-10-30 RX ORDER — OMEPRAZOLE 40 MG/1
1 CAPSULE 30 MINUTES BEFORE MORNING MEAL CAPSULE, DELAYED RELEASE ORAL ONCE A DAY
Status: ACTIVE | COMMUNITY

## 2024-10-30 NOTE — HPI-TODAY'S VISIT:
10/24   Language line  was offered and used. S/p colonoscopy which revealed multiple HP polyps, intermeal hemorrhoids present, otherwise unremarkable, 5 year recall advised, patient admits to continued diarrhea, she has mild lower abdominal cramping discomfort which gets better after a BM. The patient has continued to take anti-diarrheal meds such as loperamide prn 3-5x a week. Incomplete evacuation, notes occasional straining. The patient reports episodes of fecal incontinence before, also admits to fecal urgency. No ANORM testing before. CT a/p unremarkable   Colon 2024  The examined portion of the ileum was normal. One diminutive (1-3 mm) polyp in the sigmoid colon, removed with a cold biopsy forceps. Resected and retrieved. - HP  One small (4-6 mm) polyp at the hepatic flexure, removed with a cold snare. Resected and retrieved. - HP  One diminutive (1-3 mm) polyp at the hepatic flexure, removed with a cold snare. Resected and retrieved. - HP  Internal hemorrhoids.

## 2024-10-30 NOTE — HPI-OTHER HISTORIES
03/24 OV  Language line  was offered and used. KUB revealed constipation, and suspected overflow diarrhea, advised the patient to hold all anti-diarrheal medications (lomotil, questran, dicyclomine) and to replace w/ Miralax + Metamucil, since holding medications she notes improvement in her bowel frequency, but continues to have loose stool,  C. diff stool PCR negative, due for colon recall, last PET scan w/ Dr. Mcclain reveals no metastatic disease - Patient admits to hard stools in the morning, she will take dicyclomine prn for abdominal discomfort still, she has been continuing to take loperamide regularly for her frequent bowels  - No appetite changes, deneis unintentional weight loss, emesis (but notes occasional nausea), BRBPR, melena, SOB/CP, notes abdominal pain worse before a BM which resolves after a BM  No known family h/o colon cancer/polyps. Denies cardiac hardware, dialysis, blood thinner use, and or issues with anesthesia   September 2023 visit: Language line  was offered and used. Stool studies from May 2023 confirmed borderline elevation in fecal calprotectin of 68 and noted to have C. difficile but no toxin A/B, normal pancreatic elastase. Patient was treated with vancomycin oral for 10 days. seeing dr nathan, saw her just this week. continue to have diarrhea - improved from before. diarrhea intensity varies . upto 10 times per day. somedays there is no diarrhea. on somatuline every 4 weeks. intermittent diarrhea and constipation. takes loperamide for diarrhea which helps. takes questran powder daily. not using welchol. takes dicylomine BID which helps with abdominal pain.  May 2023 visit: Stool studies in 5/16/2023 revealed a borderline fecal calprotectin of 68.4 with C. difficile detected but toxin A/B not present.  Given symptoms of diarrhea we still prescribed patient a 10-day course of vancomycin.  Fecal elastase level was normal at 231.  February 2023 visit: Right upper quadrant ultrasound in November 22 revealed normal liver, patent portal vein, normal bile duct, absent gallbladder, 1.4 cm right kidney cyst Scheduled to have PET CT Dotatate by oncology.  Continues to have intermittent sporadin diarrhea and takes loperamide prn. reports abdominal bloating and nausea. reports DM is doing good. forgot to get list of meds.  October 22 visit: Language line  was offered and used.  We attempted EGD in July 22 which was aborted because of presence of food in the stomach, brief exam revealed no endoscopic abnormalities and gastric biopsy did not reveal any H. pylori or intestinal metaplasia.  We repeated a EGD in August which once again was aborted because of presence of food.  A third EGD was performed again in August 22 which revealed 2 small gastric body polyps, no visible endoscopic abnormalities otherwise in the esophagus or duodenum, gastric biopsy did not reveal any intestinal metaplasia or H. pylori and both gastric body polyps are benign fundic gland polyps.  Diarrhea is intermittent. better with loperamide. not on narcotics. DM on oral meds.  Rt sided abdominal discomfort X 1 yr. intermittent symptoms. hx cholecystectomy. Pt reports CT scan done by dr nathan within past 2 months was normal. we dont have this report - it was done in her office.  July 2022: Language line  was offered and used. using somatostatin 1 time per month. using loperamide prn. using bentyl TID prn. on omeprazole 40 mg. diarrhea is not always associated with meals. no rectal bleeding. periodic blackish stools. denies using peptobismol.  January 2022 visit:  Language line  was offered and used.  A colonoscopy was performed in October 2021 which revealed an adequate prep, normal TI, biopsies were obtained from throughout the colon as well as TI to evaluate for chronic diarrhea, small ascending colon polyp removed: Hyperplastic polyp, another 8 mm sigmoid colon polyp removed: Tubular adenoma, left-sided diverticulosis, repeat colonoscopy advised in 2024.  No endoscopic colitis on path. MRI abdomen with and without contrast in July 2021 did not reveal any suspicious pancreatic lesion, normal liver, absent gallbladder, no dilation of the pancreatic duct present, a small area of focal fat seen in the inferior aspect of the pancreatic body, mild adrenal gland hyperplasia.   Diarrhea resolved. takes loperamide prn. takes sandostatin 1 per month. regular po intake no abdominal pain.  September 2021 visit:  Language line  was offered and used.  Patient underwent a MRI of the abdomen with and without contrast in July 2021 which revealed a normal-appearing liver, absent gallbladder, 4 mm lesion along the inferior aspect of the pancreatic body most likely reflecting a small area of focal fat, mild adrenal hyperplasia, scattered kidney cysts, subcentimeter abdominal lymph nodes, no suspicious pancreatic lesion identified. Labs from September 2021 revealed a hemoglobin of 11.8, normal WBC, normal platelet count, creatinine 1.4, normal LFTs except slightly elevated alkaline phosphatase of 128, ferritin 19. Patient underwent a EGD in June 2021 which revealed mild gastritis, patchy erythematous and whitish appearing specks in the gastric antrum which were biopsied, no small bowel lesion up to second portion of duodenum, biopsies from duodenal bulb revealed peptic duodenitis, gastric biopsies revealed foveolar hyperplasia and focal intestinal metaplasia, small bowel biopsy did not reveal any celiac disease.  Patient did have a colonoscopy in December 2020 which revealed a 1.5 cm distal ascending colon polyp that was removed piecemeal and therefore a repeat colonoscopy was advised in 6 months, diverticulosis was seen in the sigmoid colon.   Diarrhea 3-4 times per week. on lomotil / another capsule - cant recall name. pt seeing dr nathan. on monthly sandostatin. normal po intake. no nausea. not on tpn.   May 2021 visit:  Patient was seen in March 2021 for nausea vomiting and diarrhea.  Recovered from COVID-19 infection in January 2021.  Was found to have acute renal failure needing dialysis.  Was also admitted to the ICU for septic shock.  Patient had admission to [facility] in February 2021 and was diagnosed with viral gastroenteritis, a CT scan there had revealed a 6 mm hypoattenuating pancreatic tail lesion with a small site that IPMN.  Colonoscopy in December 2020 with Dr. Gutiérrez revealed distal ascending colon tubular adenoma and sigmoid diverticulosis.  Stool tests were negative for C. difficile.  CT abdomen pelvis without IV contrast revealed no acute findings in March 2021.  LFT abnormalities were felt to be secondary to shock.  Patient did have blood culture positive for gram-negative rods and was treated with antibiotics.  EGD March 2021 revealed a normal esophagus, small gastric polyp which was removed:fundic gland polyp, anterior wall duodenal polypoid lesion measuring 5 mm in size which was removed with cold biopsy forceps:well-differentiated neuroendocrine tumor CT chest abdomen and pelvis with contrast in April 2021 revealed indeterminate few soft tissue nodular densities in the ventral pelvic soft tissue largest measuring 2.7 x 2.2 cm, mildly enlarged spleen, mildly prominent endometrium, IR guided biopsy of abdominal nodules revealed extensive fat necrosis with foreign body giant cell reaction with no malignancy identified. Hepatitis B serology negative, hepatitis C antibody negative.  Elevated chromogranin A level normal serotonin, VIP, gastrin, urine HIAA level was 4.4  and repeated again 3.9.   at present no  vomiting, diarrhea. mild nausea.  Not on diaylsis at present. good appetite. but on tpn. normal gastric emptying study in march 2021. She reports intermittent abdominal bloating as well as discomfort in her abdomen just prior to defecation.  She is already taking daily PPIMay 2023 visit:

## 2024-12-03 ENCOUNTER — TELEPHONE ENCOUNTER (OUTPATIENT)
Dept: URBAN - METROPOLITAN AREA CLINIC 25 | Facility: CLINIC | Age: 69
End: 2024-12-03

## 2025-02-05 ENCOUNTER — OFFICE VISIT (OUTPATIENT)
Dept: URBAN - METROPOLITAN AREA CLINIC 25 | Facility: CLINIC | Age: 70
End: 2025-02-05

## 2025-03-17 ENCOUNTER — OFFICE VISIT (OUTPATIENT)
Dept: URBAN - METROPOLITAN AREA CLINIC 25 | Facility: CLINIC | Age: 70
End: 2025-03-17
Payer: COMMERCIAL

## 2025-03-17 ENCOUNTER — OFFICE VISIT (OUTPATIENT)
Dept: URBAN - METROPOLITAN AREA CLINIC 84 | Facility: CLINIC | Age: 70
End: 2025-03-17

## 2025-03-17 ENCOUNTER — LAB OUTSIDE AN ENCOUNTER (OUTPATIENT)
Dept: URBAN - METROPOLITAN AREA CLINIC 25 | Facility: CLINIC | Age: 70
End: 2025-03-17

## 2025-03-17 VITALS
DIASTOLIC BLOOD PRESSURE: 80 MMHG | HEART RATE: 64 BPM | WEIGHT: 247 LBS | SYSTOLIC BLOOD PRESSURE: 131 MMHG | BODY MASS INDEX: 43.77 KG/M2 | TEMPERATURE: 98.1 F | HEIGHT: 63 IN

## 2025-03-17 DIAGNOSIS — K52.9 CHRONIC DIARRHEA: ICD-10-CM

## 2025-03-17 DIAGNOSIS — R10.84 GENERALIZED ABDOMINAL PAIN: ICD-10-CM

## 2025-03-17 DIAGNOSIS — D3A.8 BENIGN NEUROENDOCRINE TUMOR OF DUODENUM: ICD-10-CM

## 2025-03-17 DIAGNOSIS — K58.0 IRRITABLE BOWEL SYNDROME WITH DIARRHEA: ICD-10-CM

## 2025-03-17 PROCEDURE — 99214 OFFICE O/P EST MOD 30 MIN: CPT | Performed by: INTERNAL MEDICINE

## 2025-03-17 RX ORDER — COLESEVELAM HYDROCHLORIDE 625 MG/1
1-2 TABLETS WITH MEALS AS NEEDED FOR DIARRHEA TABLET, COATED ORAL
Qty: 240 | Refills: 0 | Status: ON HOLD | COMMUNITY
Start: 2022-07-22

## 2025-03-17 RX ORDER — ONDANSETRON 4 MG/1
1 TABLET ON THE TONGUE AND ALLOW TO DISSOLVE FOR NAUSEA TABLET, ORALLY DISINTEGRATING ORAL ONCE A DAY
Qty: 7 TABLET | Refills: 0 | Status: ACTIVE | COMMUNITY
Start: 2024-10-30

## 2025-03-17 RX ORDER — OMEPRAZOLE 40 MG/1
1 CAPSULE 30 MINUTES BEFORE MORNING MEAL CAPSULE, DELAYED RELEASE ORAL ONCE A DAY
Status: ACTIVE | COMMUNITY

## 2025-03-17 RX ORDER — COLESEVELAM HYDROCHLORIDE 625 MG/1
1-2 TABLETS WITH MEALS TABLET, COATED ORAL
Qty: 180 | Refills: 3 | Status: ACTIVE | COMMUNITY

## 2025-03-17 RX ORDER — DICYCLOMINE HYDROCHLORIDE 20 MG/1
1 TABLET TABLET ORAL THREE TIMES A DAY
Qty: 270 TABLET | Refills: 2 | Status: ON HOLD | COMMUNITY

## 2025-03-17 RX ORDER — DICYCLOMINE HYDROCHLORIDE 20 MG/1
1 TABLET TABLET ORAL THREE TIMES A DAY
Qty: 270 TABLET | Refills: 2 | Status: ACTIVE | COMMUNITY

## 2025-03-17 RX ORDER — ONDANSETRON 4 MG/1
1 TABLET ON THE TONGUE AND ALLOW TO DISSOLVE FOR NAUSEA TABLET, ORALLY DISINTEGRATING ORAL ONCE A DAY
Qty: 7 TABLET | Refills: 0 | Status: DISCONTINUED | COMMUNITY
Start: 2024-10-30

## 2025-03-17 NOTE — HPI-TODAY'S VISIT:
March 2025: Language line  was offered and used.  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  CAT scan chest abdomen pelvis in September 2024 reveals no recurrent or metastatic disease.  KUB from September 2023 revealed stool in the ascending and transverse colon.    Colonoscopy in April 2024 revealed an excellent bowel prep with diminutive sigmoid colon polyp, small hepatic flexure polyp and another diminutive hepatic flexure polyp.  Pathology revealed hyperplastic polyps.  EGD in August 22 revealed 2 small gastric polyps with otherwise normal exam.  Polyps were benign fundic gland polyps.  No intestinal metaplasia was noted.  History of tubular adenoma from sigmoid colon removed in 2021.  Duodenal polypectomy in March 2021 revealed a well-differentiated neuroendocrine tumor with no mitotic figures and CD56 and Ki-67 less than 3% this lesion was removed only with biopsy forceps.  Measured approximately 5 mm in size.  she has tried to stop PPI but her gerd symptoms are very severe off PPI. occasional intermittent left upper abdominal pain X unrelated to meals

## 2025-03-17 NOTE — HPI-OTHER HISTORIES
10/24 OV  Language line  was offered and used. S/p colonoscopy which revealed multiple HP polyps, internal hemorrhoids present, otherwise unremarkable, 5 year recall advised, patient admits to continued diarrhea, she has mild lower abdominal cramping discomfort which gets better after a BM. The patient has continued to take anti-diarrheal meds such as loperamide prn 3-5x a week. Incomplete evacuation, notes occasional straining. The patient reports episodes of fecal incontinence before, also admits to fecal urgency. No ANORM testing before. CT a/p unremarkable   Colon 2024  The examined portion of the ileum was normal. One diminutive (1-3 mm) polyp in the sigmoid colon, removed with a cold biopsy forceps. Resected and retrieved. - HP  One small (4-6 mm) polyp at the hepatic flexure, removed with a cold snare. Resected and retrieved. - HP  One diminutive (1-3 mm) polyp at the hepatic flexure, removed with a cold snare. Resected and retrieved. - HP  Internal hemorrhoids.  03/24 OV  Language line  was offered and used. KUB revealed constipation, and suspected overflow diarrhea, advised the patient to hold all anti-diarrheal medications (lomotil, questran, dicyclomine) and to replace w/ Miralax + Metamucil, since holding medications she notes improvement in her bowel frequency, but continues to have loose stool,  C. diff stool PCR negative, due for colon recall, last PET scan w/ Dr. Mcclain reveals no metastatic disease - Patient admits to hard stools in the morning, she will take dicyclomine prn for abdominal discomfort still, she has been continuing to take loperamide regularly for her frequent bowels  - No appetite changes, deneis unintentional weight loss, emesis (but notes occasional nausea), BRBPR, melena, SOB/CP, notes abdominal pain worse before a BM which resolves after a BM  No known family h/o colon cancer/polyps. Denies cardiac hardware, dialysis, blood thinner use, and or issues with anesthesia   September 2023 visit: Language line  was offered and used. Stool studies from May 2023 confirmed borderline elevation in fecal calprotectin of 68 and noted to have C. difficile but no toxin A/B, normal pancreatic elastase. Patient was treated with vancomycin oral for 10 days. seeing dr nathan, saw her just this week. continue to have diarrhea - improved from before. diarrhea intensity varies . upto 10 times per day. somedays there is no diarrhea. on somatuline every 4 weeks. intermittent diarrhea and constipation. takes loperamide for diarrhea which helps. takes questran powder daily. not using welchol. takes dicylomine BID which helps with abdominal pain.  May 2023 visit: Stool studies in 5/16/2023 revealed a borderline fecal calprotectin of 68.4 with C. difficile detected but toxin A/B not present.  Given symptoms of diarrhea we still prescribed patient a 10-day course of vancomycin.  Fecal elastase level was normal at 231.  February 2023 visit: Right upper quadrant ultrasound in November 22 revealed normal liver, patent portal vein, normal bile duct, absent gallbladder, 1.4 cm right kidney cyst Scheduled to have PET CT Dotatate by oncology.  Continues to have intermittent sporadin diarrhea and takes loperamide prn. reports abdominal bloating and nausea. reports DM is doing good. forgot to get list of meds.  October 22 visit: Language line  was offered and used.  We attempted EGD in July 22 which was aborted because of presence of food in the stomach, brief exam revealed no endoscopic abnormalities and gastric biopsy did not reveal any H. pylori or intestinal metaplasia.  We repeated a EGD in August which once again was aborted because of presence of food.  A third EGD was performed again in August 22 which revealed 2 small gastric body polyps, no visible endoscopic abnormalities otherwise in the esophagus or duodenum, gastric biopsy did not reveal any intestinal metaplasia or H. pylori and both gastric body polyps are benign fundic gland polyps.  Diarrhea is intermittent. better with loperamide. not on narcotics. DM on oral meds.  Rt sided abdominal discomfort X 1 yr. intermittent symptoms. hx cholecystectomy. Pt reports CT scan done by dr nathan within past 2 months was normal. we dont have this report - it was done in her office.  July 2022: Language line  was offered and used. using somatostatin 1 time per month. using loperamide prn. using bentyl TID prn. on omeprazole 40 mg. diarrhea is not always associated with meals. no rectal bleeding. periodic blackish stools. denies using peptobismol.  January 2022 visit:  Language line  was offered and used.  A colonoscopy was performed in October 2021 which revealed an adequate prep, normal TI, biopsies were obtained from throughout the colon as well as TI to evaluate for chronic diarrhea, small ascending colon polyp removed: Hyperplastic polyp, another 8 mm sigmoid colon polyp removed: Tubular adenoma, left-sided diverticulosis, repeat colonoscopy advised in 2024.  No endoscopic colitis on path. MRI abdomen with and without contrast in July 2021 did not reveal any suspicious pancreatic lesion, normal liver, absent gallbladder, no dilation of the pancreatic duct present, a small area of focal fat seen in the inferior aspect of the pancreatic body, mild adrenal gland hyperplasia.   Diarrhea resolved. takes loperamide prn. takes sandostatin 1 per month. regular po intake no abdominal pain.  September 2021 visit:  Language line  was offered and used.  Patient underwent a MRI of the abdomen with and without contrast in July 2021 which revealed a normal-appearing liver, absent gallbladder, 4 mm lesion along the inferior aspect of the pancreatic body most likely reflecting a small area of focal fat, mild adrenal hyperplasia, scattered kidney cysts, subcentimeter abdominal lymph nodes, no suspicious pancreatic lesion identified. Labs from September 2021 revealed a hemoglobin of 11.8, normal WBC, normal platelet count, creatinine 1.4, normal LFTs except slightly elevated alkaline phosphatase of 128, ferritin 19. Patient underwent a EGD in June 2021 which revealed mild gastritis, patchy erythematous and whitish appearing specks in the gastric antrum which were biopsied, no small bowel lesion up to second portion of duodenum, biopsies from duodenal bulb revealed peptic duodenitis, gastric biopsies revealed foveolar hyperplasia and focal intestinal metaplasia, small bowel biopsy did not reveal any celiac disease.  Patient did have a colonoscopy in December 2020 which revealed a 1.5 cm distal ascending colon polyp that was removed piecemeal and therefore a repeat colonoscopy was advised in 6 months, diverticulosis was seen in the sigmoid colon.   Diarrhea 3-4 times per week. on lomotil / another capsule - cant recall name. pt seeing dr nathan. on monthly sandostatin. normal po intake. no nausea. not on tpn.   May 2021 visit:  Patient was seen in March 2021 for nausea vomiting and diarrhea.  Recovered from COVID-19 infection in January 2021.  Was found to have acute renal failure needing dialysis.  Was also admitted to the ICU for septic shock.  Patient had admission to [facility] in February 2021 and was diagnosed with viral gastroenteritis, a CT scan there had revealed a 6 mm hypoattenuating pancreatic tail lesion with a small site that IPMN.  Colonoscopy in December 2020 with Dr. Gutiérrez revealed distal ascending colon tubular adenoma and sigmoid diverticulosis.  Stool tests were negative for C. difficile.  CT abdomen pelvis without IV contrast revealed no acute findings in March 2021.  LFT abnormalities were felt to be secondary to shock.  Patient did have blood culture positive for gram-negative rods and was treated with antibiotics.  EGD March 2021 revealed a normal esophagus, small gastric polyp which was removed:fundic gland polyp, anterior wall duodenal polypoid lesion measuring 5 mm in size which was removed with cold biopsy forceps:well-differentiated neuroendocrine tumor CT chest abdomen and pelvis with contrast in April 2021 revealed indeterminate few soft tissue nodular densities in the ventral pelvic soft tissue largest measuring 2.7 x 2.2 cm, mildly enlarged spleen, mildly prominent endometrium, IR guided biopsy of abdominal nodules revealed extensive fat necrosis with foreign body giant cell reaction with no malignancy identified. Hepatitis B serology negative, hepatitis C antibody negative.  Elevated chromogranin A level normal serotonin, VIP, gastrin, urine HIAA level was 4.4  and repeated again 3.9.   at present no  vomiting, diarrhea. mild nausea.  Not on diaylsis at present. good appetite. but on tpn. normal gastric emptying study in march 2021. She reports intermittent abdominal bloating as well as discomfort in her abdomen just prior to defecation.  She is already taking daily PPIMay 2023 visit:

## 2025-04-08 ENCOUNTER — CLAIMS CREATED FROM THE CLAIM WINDOW (OUTPATIENT)
Dept: URBAN - METROPOLITAN AREA SURGERY CENTER 20 | Facility: SURGERY CENTER | Age: 70
End: 2025-04-08
Payer: COMMERCIAL

## 2025-04-08 ENCOUNTER — TELEPHONE ENCOUNTER (OUTPATIENT)
Dept: URBAN - METROPOLITAN AREA CLINIC 25 | Facility: CLINIC | Age: 70
End: 2025-04-08

## 2025-04-08 ENCOUNTER — CLAIMS CREATED FROM THE CLAIM WINDOW (OUTPATIENT)
Dept: URBAN - METROPOLITAN AREA CLINIC 4 | Facility: CLINIC | Age: 70
End: 2025-04-08
Payer: COMMERCIAL

## 2025-04-08 DIAGNOSIS — Z86.012 PERSONAL HISTORY OF BENIGN CARCINOID TUMOR: ICD-10-CM

## 2025-04-08 DIAGNOSIS — K31.A11 GASTRIC INTESTINAL METAPLASIA WITHOUT DYSPLASIA, INVOLVING THE ANTRUM: ICD-10-CM

## 2025-04-08 DIAGNOSIS — K31.89 OTHER DISEASES OF STOMACH AND DUODENUM: ICD-10-CM

## 2025-04-08 DIAGNOSIS — K31.7 POLYP OF STOMACH: ICD-10-CM

## 2025-04-08 DIAGNOSIS — R10.13 ABDOMINAL DISCOMFORT, EPIGASTRIC: ICD-10-CM

## 2025-04-08 DIAGNOSIS — K29.70 GASTRITIS, UNSPECIFIED, WITHOUT BLEEDING: ICD-10-CM

## 2025-04-08 DIAGNOSIS — K31.A11 INTESTINAL METAPLASIA OF ANTRUM OF STOMACH WITHOUT DYSPLASIA: ICD-10-CM

## 2025-04-08 DIAGNOSIS — T47.8X5A ADVERSE EFFECT OF OTHER AGENTS PRIMARILY AFFECTING GASTROINTESTINAL SYSTEM, INITIAL ENCOUNTER: ICD-10-CM

## 2025-04-08 DIAGNOSIS — K31.89 REACTIVE GASTROPATHY: ICD-10-CM

## 2025-04-08 DIAGNOSIS — K44.9 HIATAL HERNIA: ICD-10-CM

## 2025-04-08 PROCEDURE — 43251 EGD REMOVE LESION SNARE: CPT | Performed by: INTERNAL MEDICINE

## 2025-04-08 PROCEDURE — 88305 TISSUE EXAM BY PATHOLOGIST: CPT | Performed by: PATHOLOGY

## 2025-04-08 PROCEDURE — 88342 IMHCHEM/IMCYTCHM 1ST ANTB: CPT | Performed by: PATHOLOGY

## 2025-04-08 PROCEDURE — 00731 ANES UPR GI NDSC PX NOS: CPT | Performed by: NURSE ANESTHETIST, CERTIFIED REGISTERED

## 2025-04-08 PROCEDURE — 43239 EGD BIOPSY SINGLE/MULTIPLE: CPT | Performed by: INTERNAL MEDICINE

## 2025-04-08 RX ORDER — COLESEVELAM HYDROCHLORIDE 625 MG/1
1-2 TABLETS WITH MEALS TABLET, COATED ORAL
Qty: 180 | Refills: 3 | Status: ACTIVE | COMMUNITY

## 2025-04-08 RX ORDER — DICYCLOMINE HYDROCHLORIDE 20 MG/1
1 TABLET TABLET ORAL THREE TIMES A DAY
Qty: 270 TABLET | Refills: 2 | Status: ACTIVE | COMMUNITY

## 2025-04-08 RX ORDER — COLESEVELAM HYDROCHLORIDE 625 MG/1
1-2 TABLETS WITH MEALS AS NEEDED FOR DIARRHEA TABLET, COATED ORAL
Qty: 240 | Refills: 0 | Status: ON HOLD | COMMUNITY
Start: 2022-07-22

## 2025-04-08 RX ORDER — OMEPRAZOLE 40 MG/1
1 CAPSULE 30 MINUTES BEFORE MORNING MEAL CAPSULE, DELAYED RELEASE ORAL ONCE A DAY
Status: ACTIVE | COMMUNITY

## 2025-05-21 ENCOUNTER — P2P PATIENT RECORD (OUTPATIENT)
Age: 70
End: 2025-05-21

## 2025-06-03 ENCOUNTER — OFFICE VISIT (OUTPATIENT)
Dept: URBAN - METROPOLITAN AREA CLINIC 84 | Facility: CLINIC | Age: 70
End: 2025-06-03
Payer: COMMERCIAL

## 2025-06-03 ENCOUNTER — TELEPHONE ENCOUNTER (OUTPATIENT)
Dept: URBAN - METROPOLITAN AREA CLINIC 84 | Facility: CLINIC | Age: 70
End: 2025-06-03

## 2025-06-03 DIAGNOSIS — D3A.8 BENIGN NEUROENDOCRINE TUMOR OF DUODENUM: ICD-10-CM

## 2025-06-03 DIAGNOSIS — K59.1 FUNCTIONAL DIARRHEA: ICD-10-CM

## 2025-06-03 DIAGNOSIS — R10.84 GENERALIZED ABDOMINAL PAIN: ICD-10-CM

## 2025-06-03 DIAGNOSIS — K58.0 IRRITABLE BOWEL SYNDROME WITH DIARRHEA: ICD-10-CM

## 2025-06-03 DIAGNOSIS — K31.89 INTESTINAL METAPLASIA OF GASTRIC MUCOSA: ICD-10-CM

## 2025-06-03 DIAGNOSIS — R12 HEARTBURN: ICD-10-CM

## 2025-06-03 PROCEDURE — 99214 OFFICE O/P EST MOD 30 MIN: CPT | Performed by: INTERNAL MEDICINE

## 2025-06-03 RX ORDER — METOCLOPRAMIDE 10 MG/1
TABLET ORAL
Qty: 10 TABLET | Status: ON HOLD | COMMUNITY

## 2025-06-03 RX ORDER — SUCRALFATE 1 G/1
TABLET ORAL
Qty: 120 TABLET | Status: ACTIVE | COMMUNITY

## 2025-06-03 RX ORDER — NITROFURANTOIN (MONOHYDRATE/MACROCRYSTALS) 25; 75 MG/1; MG/1
CAPSULE ORAL
Qty: 14 CAPSULE | Status: ON HOLD | COMMUNITY

## 2025-06-03 RX ORDER — COLESEVELAM HYDROCHLORIDE 625 MG/1
1-2 TABLETS WITH MEALS AS NEEDED FOR DIARRHEA TABLET, COATED ORAL
Qty: 240 | Refills: 0 | Status: ON HOLD | COMMUNITY
Start: 2022-07-22

## 2025-06-03 RX ORDER — FAMOTIDINE 20 MG/1
TABLET, FILM COATED ORAL
Qty: 30 TABLET | Status: ACTIVE | COMMUNITY

## 2025-06-03 RX ORDER — LANSOPRAZOLE 30 MG/1
1 CAPSULE 1/2 TO 1 HOUR BEFORE MORNING MEAL CAPSULE, DELAYED RELEASE ORAL ONCE A DAY
Qty: 90 CAPSULE | Refills: 3 | OUTPATIENT
Start: 2025-06-03

## 2025-06-03 RX ORDER — FERROUS SULFATE 325(65) MG
1 TABLET TABLET ORAL
Status: ACTIVE | COMMUNITY

## 2025-06-03 RX ORDER — GLIMEPIRIDE 1 MG/1
TABLET ORAL
Qty: 90 TABLET | Status: ON HOLD | COMMUNITY

## 2025-06-03 RX ORDER — CEFADROXIL 500 MG/1
CAPSULE ORAL
Qty: 10 CAPSULE | Status: ON HOLD | COMMUNITY

## 2025-06-03 RX ORDER — CARVEDILOL 25 MG/1
TABLET, FILM COATED ORAL
Qty: 180 TABLET | Status: ACTIVE | COMMUNITY

## 2025-06-03 RX ORDER — NORTRIPTYLINE HYDROCHLORIDE 10 MG/1
1 CAPSULE AT BEDTIME CAPSULE ORAL ONCE A DAY
Qty: 30 | OUTPATIENT
Start: 2025-06-03

## 2025-06-03 RX ORDER — AMLODIPINE BESYLATE 10 MG/1
TABLET ORAL
Qty: 90 TABLET | Status: ACTIVE | COMMUNITY

## 2025-06-03 RX ORDER — LISINOPRIL 5 MG/1
TABLET ORAL
Qty: 90 TABLET | Status: ACTIVE | COMMUNITY

## 2025-06-03 RX ORDER — BECLOMETHASONE DIPROPIONATE HFA 40 UG/1
AEROSOL, METERED RESPIRATORY (INHALATION)
Qty: 10.6 GRAM | Status: ON HOLD | COMMUNITY

## 2025-06-03 RX ORDER — ERGOCALCIFEROL CAPSULES, 1.25 MG/1
CAPSULE ORAL
Qty: 12 CAPSULE | Status: ON HOLD | COMMUNITY

## 2025-06-03 RX ORDER — OMEPRAZOLE 40 MG/1
CAPSULE, DELAYED RELEASE ORAL
Qty: 90 CAPSULE | Status: ACTIVE | COMMUNITY

## 2025-06-03 RX ORDER — ATORVASTATIN CALCIUM 80 MG/1
TABLET, FILM COATED ORAL
Qty: 30 TABLET | Status: ON HOLD | COMMUNITY

## 2025-06-03 RX ORDER — BACLOFEN 10 MG/1
TABLET ORAL
Qty: 60 TABLET | Status: ACTIVE | COMMUNITY

## 2025-06-03 RX ORDER — BECLOMETHASONE DIPROPIONATE HFA 40 UG/1
INHALE 2 PUFFS BY MOUTH IN THE MORNING AND AT BEDTIME AEROSOL, METERED RESPIRATORY (INHALATION)
Qty: 11 GRAM | Refills: 4 | Status: ON HOLD | COMMUNITY

## 2025-06-03 RX ORDER — TIRZEPATIDE 2.5 MG/.5ML
INJECTION, SOLUTION SUBCUTANEOUS
Qty: 2 MILLILITER | Status: ACTIVE | COMMUNITY

## 2025-06-03 RX ORDER — ALBUTEROL SULFATE 90 UG/1
AEROSOL, METERED RESPIRATORY (INHALATION)
Qty: 8.5 GRAM | Status: ON HOLD | COMMUNITY

## 2025-06-03 RX ORDER — DICYCLOMINE HYDROCHLORIDE 20 MG/1
TABLET ORAL
Qty: 50 TABLET | Status: ACTIVE | COMMUNITY

## 2025-06-03 NOTE — HPI-OTHER HISTORIES
March 2025: Language line  was offered and used.  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  CAT scan chest abdomen pelvis in September 2024 reveals no recurrent or metastatic disease.  KUB from September 2023 revealed stool in the ascending and transverse colon.    Colonoscopy in April 2024 revealed an excellent bowel prep with diminutive sigmoid colon polyp, small hepatic flexure polyp and another diminutive hepatic flexure polyp. Pathology revealed hyperplastic polyps.  EGD in August 22 revealed 2 small gastric polyps with otherwise normal exam. Polyps were benign fundic gland polyps. No intestinal metaplasia was noted.  History of tubular adenoma from sigmoid colon removed in 2021.  Duodenal polypectomy in March 2021 revealed a well-differentiated neuroendocrine tumor with no mitotic figures and CD56 and Ki-67 less than 3% this lesion was removed only with biopsy forceps. Measured approximately 5 mm in size.  she has tried to stop PPI but her gerd symptoms are very severe off PPI. occasional intermittent left upper abdominal pain X unrelated to meals  10/24 OV  Language line  was offered and used. S/p colonoscopy which revealed multiple HP polyps, internal hemorrhoids present, otherwise unremarkable, 5 year recall advised, patient admits to continued diarrhea, she has mild lower abdominal cramping discomfort which gets better after a BM. The patient has continued to take anti-diarrheal meds such as loperamide prn 3-5x a week. Incomplete evacuation, notes occasional straining. The patient reports episodes of fecal incontinence before, also admits to fecal urgency. No ANORM testing before. CT a/p unremarkable   Colon 2024  The examined portion of the ileum was normal. One diminutive (1-3 mm) polyp in the sigmoid colon, removed with a cold biopsy forceps. Resected and retrieved. - HP  One small (4-6 mm) polyp at the hepatic flexure, removed with a cold snare. Resected and retrieved. - HP  One diminutive (1-3 mm) polyp at the hepatic flexure, removed with a cold snare. Resected and retrieved. - HP  Internal hemorrhoids.  03/24 OV  Language line  was offered and used. KUB revealed constipation, and suspected overflow diarrhea, advised the patient to hold all anti-diarrheal medications (lomotil, questran, dicyclomine) and to replace w/ Miralax + Metamucil, since holding medications she notes improvement in her bowel frequency, but continues to have loose stool,  C. diff stool PCR negative, due for colon recall, last PET scan w/ Dr. Mcclain reveals no metastatic disease - Patient admits to hard stools in the morning, she will take dicyclomine prn for abdominal discomfort still, she has been continuing to take loperamide regularly for her frequent bowels  - No appetite changes, deneis unintentional weight loss, emesis (but notes occasional nausea), BRBPR, melena, SOB/CP, notes abdominal pain worse before a BM which resolves after a BM  No known family h/o colon cancer/polyps. Denies cardiac hardware, dialysis, blood thinner use, and or issues with anesthesia   September 2023 visit: Language line  was offered and used. Stool studies from May 2023 confirmed borderline elevation in fecal calprotectin of 68 and noted to have C. difficile but no toxin A/B, normal pancreatic elastase. Patient was treated with vancomycin oral for 10 days. seeing dr nathan, saw her just this week. continue to have diarrhea - improved from before. diarrhea intensity varies . upto 10 times per day. somedays there is no diarrhea. on somatuline every 4 weeks. intermittent diarrhea and constipation. takes loperamide for diarrhea which helps. takes questran powder daily. not using welchol. takes dicylomine BID which helps with abdominal pain.  May 2023 visit: Stool studies in 5/16/2023 revealed a borderline fecal calprotectin of 68.4 with C. difficile detected but toxin A/B not present.  Given symptoms of diarrhea we still prescribed patient a 10-day course of vancomycin.  Fecal elastase level was normal at 231.  February 2023 visit: Right upper quadrant ultrasound in November 22 revealed normal liver, patent portal vein, normal bile duct, absent gallbladder, 1.4 cm right kidney cyst Scheduled to have PET CT Dotatate by oncology.  Continues to have intermittent sporadin diarrhea and takes loperamide prn. reports abdominal bloating and nausea. reports DM is doing good. forgot to get list of meds.  October 22 visit: Language line  was offered and used.  We attempted EGD in July 22 which was aborted because of presence of food in the stomach, brief exam revealed no endoscopic abnormalities and gastric biopsy did not reveal any H. pylori or intestinal metaplasia.  We repeated a EGD in August which once again was aborted because of presence of food.  A third EGD was performed again in August 22 which revealed 2 small gastric body polyps, no visible endoscopic abnormalities otherwise in the esophagus or duodenum, gastric biopsy did not reveal any intestinal metaplasia or H. pylori and both gastric body polyps are benign fundic gland polyps.  Diarrhea is intermittent. better with loperamide. not on narcotics. DM on oral meds.  Rt sided abdominal discomfort X 1 yr. intermittent symptoms. hx cholecystectomy. Pt reports CT scan done by dr nathan within past 2 months was normal. we dont have this report - it was done in her office.  July 2022: Language line  was offered and used. using somatostatin 1 time per month. using loperamide prn. using bentyl TID prn. on omeprazole 40 mg. diarrhea is not always associated with meals. no rectal bleeding. periodic blackish stools. denies using peptobismol.  January 2022 visit:  Language line  was offered and used.  A colonoscopy was performed in October 2021 which revealed an adequate prep, normal TI, biopsies were obtained from throughout the colon as well as TI to evaluate for chronic diarrhea, small ascending colon polyp removed: Hyperplastic polyp, another 8 mm sigmoid colon polyp removed: Tubular adenoma, left-sided diverticulosis, repeat colonoscopy advised in 2024.  No endoscopic colitis on path. MRI abdomen with and without contrast in July 2021 did not reveal any suspicious pancreatic lesion, normal liver, absent gallbladder, no dilation of the pancreatic duct present, a small area of focal fat seen in the inferior aspect of the pancreatic body, mild adrenal gland hyperplasia.   Diarrhea resolved. takes loperamide prn. takes sandostatin 1 per month. regular po intake no abdominal pain.  September 2021 visit:  Language line  was offered and used.  Patient underwent a MRI of the abdomen with and without contrast in July 2021 which revealed a normal-appearing liver, absent gallbladder, 4 mm lesion along the inferior aspect of the pancreatic body most likely reflecting a small area of focal fat, mild adrenal hyperplasia, scattered kidney cysts, subcentimeter abdominal lymph nodes, no suspicious pancreatic lesion identified. Labs from September 2021 revealed a hemoglobin of 11.8, normal WBC, normal platelet count, creatinine 1.4, normal LFTs except slightly elevated alkaline phosphatase of 128, ferritin 19. Patient underwent a EGD in June 2021 which revealed mild gastritis, patchy erythematous and whitish appearing specks in the gastric antrum which were biopsied, no small bowel lesion up to second portion of duodenum, biopsies from duodenal bulb revealed peptic duodenitis, gastric biopsies revealed foveolar hyperplasia and focal intestinal metaplasia, small bowel biopsy did not reveal any celiac disease.  Patient did have a colonoscopy in December 2020 which revealed a 1.5 cm distal ascending colon polyp that was removed piecemeal and therefore a repeat colonoscopy was advised in 6 months, diverticulosis was seen in the sigmoid colon.   Diarrhea 3-4 times per week. on lomotil / another capsule - cant recall name. pt seeing dr nathan. on monthly sandostatin. normal po intake. no nausea. not on tpn.   May 2021 visit:  Patient was seen in March 2021 for nausea vomiting and diarrhea.  Recovered from COVID-19 infection in January 2021.  Was found to have acute renal failure needing dialysis.  Was also admitted to the ICU for septic shock.  Patient had admission to [facility] in February 2021 and was diagnosed with viral gastroenteritis, a CT scan there had revealed a 6 mm hypoattenuating pancreatic tail lesion with a small site that IPMN.  Colonoscopy in December 2020 with Dr. Gutiérrez revealed distal ascending colon tubular adenoma and sigmoid diverticulosis.  Stool tests were negative for C. difficile.  CT abdomen pelvis without IV contrast revealed no acute findings in March 2021.  LFT abnormalities were felt to be secondary to shock.  Patient did have blood culture positive for gram-negative rods and was treated with antibiotics.  EGD March 2021 revealed a normal esophagus, small gastric polyp which was removed:fundic gland polyp, anterior wall duodenal polypoid lesion measuring 5 mm in size which was removed with cold biopsy forceps:well-differentiated neuroendocrine tumor CT chest abdomen and pelvis with contrast in April 2021 revealed indeterminate few soft tissue nodular densities in the ventral pelvic soft tissue largest measuring 2.7 x 2.2 cm, mildly enlarged spleen, mildly prominent endometrium, IR guided biopsy of abdominal nodules revealed extensive fat necrosis with foreign body giant cell reaction with no malignancy identified. Hepatitis B serology negative, hepatitis C antibody negative.  Elevated chromogranin A level normal serotonin, VIP, gastrin, urine HIAA level was 4.4  and repeated again 3.9.   at present no  vomiting, diarrhea. mild nausea.  Not on diaylsis at present. good appetite. but on tpn. normal gastric emptying study in march 2021. She reports intermittent abdominal bloating as well as discomfort in her abdomen just prior to defecation.  She is already taking daily PPIMay 2023 visit:

## 2025-06-03 NOTE — HPI-TODAY'S VISIT:
June 2025 visit: ER visit in May 2025 for abdominal pain. CAT scan abdomen pelvis with contrast from 5/28/2025 showed mild urinary bladder wall thickening concerning for cystitis, 4 mm hypodensity in the spleen not seen on prior studies, absent gallbladder, fatty liver, normal bowel, small right kidney cysts.  Labs revealed creatinine 1.6, GFR 35, slightly elevated alkaline phosphatase 146 with otherwise normal LFTs including normal bilirubin, normal CBC, lipase.  Patient was treated for UTI with antibiotics.  EGD from April 2025 revealed a 6 mm gastric polyp, small hiatal hernia, pathology from gastric polyp was benign, antral biopsies revealed focal intestinal metaplasia, no H. pylori present.  Normal duodenum.  Patient is following up with oncology closely and was started on Xermelto and Lanreotide. PET ordered. Looks like this was already done   She reports periodic abdominal pain, starts from her lower abdomen, then also occurs in upper abdomen and also moves into her chest. she feels bloated. abdominal symptoms triggered by even drinking water / any food.  she started mounjaro for 4 weeks but her GI symptoms predate this.   after taking antibiotics for her UTI recently she felt her GI symptoms got better.   Dr Becker is her cardiologist

## 2025-06-17 ENCOUNTER — LAB OUTSIDE AN ENCOUNTER (OUTPATIENT)
Dept: URBAN - METROPOLITAN AREA CLINIC 25 | Facility: CLINIC | Age: 70
End: 2025-06-17

## 2025-06-19 ENCOUNTER — TELEPHONE ENCOUNTER (OUTPATIENT)
Dept: URBAN - METROPOLITAN AREA CLINIC 25 | Facility: CLINIC | Age: 70
End: 2025-06-19

## 2025-06-19 LAB
ADENOVIRUS F 40/41: NOT DETECTED
CALPROTECTIN, STOOL - QDX: (no result)
CAMPYLOBACTER: NOT DETECTED
CLOSTRIDIUM DIFFICILE: NOT DETECTED
ENTAMOEBA HISTOLYTICA: NOT DETECTED
ENTEROAGGREGATIVE E.COLI: NOT DETECTED
ENTEROTOXIGENIC E.COLI: NOT DETECTED
ESCHERICHIA COLI O157: NOT DETECTED
GIARDIA LAMBLIA: NOT DETECTED
NOROVIRUS GI/GII: NOT DETECTED
ROTAVIRUS A: NOT DETECTED
SALMONELLA SPP.: NOT DETECTED
SHIGA-LIKE TOXIN PRODUCING E.COLI: NOT DETECTED
SHIGELLA SPP. / ENTEROINVASIVE E.COLI: NOT DETECTED
VIBRIO PARAHAEMOLYTICUS: NOT DETECTED
VIBRIO SPP.: NOT DETECTED
YERSINIA ENTEROCOLITICA: NOT DETECTED

## 2025-07-07 ENCOUNTER — OFFICE VISIT (OUTPATIENT)
Dept: URBAN - METROPOLITAN AREA CLINIC 25 | Facility: CLINIC | Age: 70
End: 2025-07-07
Payer: COMMERCIAL

## 2025-07-07 ENCOUNTER — LAB OUTSIDE AN ENCOUNTER (OUTPATIENT)
Dept: URBAN - METROPOLITAN AREA CLINIC 25 | Facility: CLINIC | Age: 70
End: 2025-07-07

## 2025-07-07 DIAGNOSIS — K52.9 CHRONIC DIARRHEA: ICD-10-CM

## 2025-07-07 DIAGNOSIS — D3A.8 BENIGN NEUROENDOCRINE TUMOR OF DUODENUM: ICD-10-CM

## 2025-07-07 DIAGNOSIS — R10.84 GENERALIZED ABDOMINAL PAIN: ICD-10-CM

## 2025-07-07 DIAGNOSIS — R12 HEARTBURN: ICD-10-CM

## 2025-07-07 DIAGNOSIS — R11.0 CHRONIC NAUSEA: ICD-10-CM

## 2025-07-07 DIAGNOSIS — K31.89 INTESTINAL METAPLASIA OF GASTRIC MUCOSA: ICD-10-CM

## 2025-07-07 DIAGNOSIS — R74.8 ELEVATED ALKALINE PHOSPHATASE LEVEL: ICD-10-CM

## 2025-07-07 PROCEDURE — 99214 OFFICE O/P EST MOD 30 MIN: CPT | Performed by: INTERNAL MEDICINE

## 2025-07-07 RX ORDER — NITROFURANTOIN (MONOHYDRATE/MACROCRYSTALS) 25; 75 MG/1; MG/1
CAPSULE ORAL
Qty: 14 CAPSULE | Status: ON HOLD | COMMUNITY

## 2025-07-07 RX ORDER — DICYCLOMINE HYDROCHLORIDE 20 MG/1
TABLET ORAL
Qty: 50 TABLET | Status: ACTIVE | COMMUNITY

## 2025-07-07 RX ORDER — TIRZEPATIDE 2.5 MG/.5ML
INJECTION, SOLUTION SUBCUTANEOUS
Qty: 2 MILLILITER | Status: ACTIVE | COMMUNITY

## 2025-07-07 RX ORDER — FERROUS SULFATE 325(65) MG
1 TABLET TABLET ORAL
Status: ACTIVE | COMMUNITY

## 2025-07-07 RX ORDER — GLIMEPIRIDE 1 MG/1
TABLET ORAL
Qty: 90 TABLET | Status: ON HOLD | COMMUNITY

## 2025-07-07 RX ORDER — BECLOMETHASONE DIPROPIONATE HFA 40 UG/1
INHALE 2 PUFFS BY MOUTH IN THE MORNING AND AT BEDTIME AEROSOL, METERED RESPIRATORY (INHALATION)
Qty: 11 GRAM | Refills: 4 | Status: ON HOLD | COMMUNITY

## 2025-07-07 RX ORDER — BACLOFEN 10 MG/1
TABLET ORAL
Qty: 60 TABLET | Status: ACTIVE | COMMUNITY

## 2025-07-07 RX ORDER — BECLOMETHASONE DIPROPIONATE HFA 40 UG/1
AEROSOL, METERED RESPIRATORY (INHALATION)
Qty: 10.6 GRAM | Status: ON HOLD | COMMUNITY

## 2025-07-07 RX ORDER — NORTRIPTYLINE HYDROCHLORIDE 25 MG/1
1 CAPSULE AT BEDTIME CAPSULE ORAL ONCE A DAY
Qty: 90 CAPSULE | Refills: 1 | OUTPATIENT
Start: 2025-07-07

## 2025-07-07 RX ORDER — CARVEDILOL 25 MG/1
TABLET, FILM COATED ORAL
Qty: 180 TABLET | Status: ACTIVE | COMMUNITY

## 2025-07-07 RX ORDER — AMLODIPINE BESYLATE 10 MG/1
TABLET ORAL
Qty: 90 TABLET | Status: ACTIVE | COMMUNITY

## 2025-07-07 RX ORDER — CEFADROXIL 500 MG/1
CAPSULE ORAL
Qty: 10 CAPSULE | Status: ON HOLD | COMMUNITY

## 2025-07-07 RX ORDER — LANSOPRAZOLE 30 MG/1
1 CAPSULE 1/2 TO 1 HOUR BEFORE MORNING MEAL CAPSULE, DELAYED RELEASE ORAL ONCE A DAY
Qty: 90 CAPSULE | Refills: 3 | Status: ACTIVE | COMMUNITY
Start: 2025-06-03

## 2025-07-07 RX ORDER — METOCLOPRAMIDE 10 MG/1
TABLET ORAL
Qty: 10 TABLET | Status: ON HOLD | COMMUNITY

## 2025-07-07 RX ORDER — NORTRIPTYLINE HYDROCHLORIDE 10 MG/1
1 CAPSULE AT BEDTIME CAPSULE ORAL ONCE A DAY
Qty: 30 | Status: ACTIVE | COMMUNITY
Start: 2025-06-03

## 2025-07-07 RX ORDER — ATORVASTATIN CALCIUM 80 MG/1
TABLET, FILM COATED ORAL
Qty: 30 TABLET | Status: ON HOLD | COMMUNITY

## 2025-07-07 RX ORDER — ERGOCALCIFEROL CAPSULES, 1.25 MG/1
CAPSULE ORAL
Qty: 12 CAPSULE | Status: ON HOLD | COMMUNITY

## 2025-07-07 RX ORDER — OMEPRAZOLE 40 MG/1
CAPSULE, DELAYED RELEASE ORAL
Qty: 90 CAPSULE | Status: ACTIVE | COMMUNITY

## 2025-07-07 RX ORDER — COLESEVELAM HYDROCHLORIDE 625 MG/1
1-2 TABLETS WITH MEALS AS NEEDED FOR DIARRHEA TABLET, COATED ORAL
Qty: 240 | Refills: 0 | Status: ON HOLD | COMMUNITY
Start: 2022-07-22

## 2025-07-07 RX ORDER — SUCRALFATE 1 G/1
TABLET ORAL
Qty: 120 TABLET | Status: ACTIVE | COMMUNITY

## 2025-07-07 RX ORDER — ALBUTEROL SULFATE 90 UG/1
AEROSOL, METERED RESPIRATORY (INHALATION)
Qty: 8.5 GRAM | Status: ON HOLD | COMMUNITY

## 2025-07-07 RX ORDER — FAMOTIDINE 20 MG/1
TABLET, FILM COATED ORAL
Qty: 30 TABLET | Status: ACTIVE | COMMUNITY

## 2025-07-07 RX ORDER — LISINOPRIL 5 MG/1
TABLET ORAL
Qty: 90 TABLET | Status: ACTIVE | COMMUNITY

## 2025-07-07 NOTE — HPI-OTHER HISTORIES
June 2025 visit: ER visit in May 2025 for abdominal pain. CAT scan abdomen pelvis with contrast from 5/28/2025 showed mild urinary bladder wall thickening concerning for cystitis, 4 mm hypodensity in the spleen not seen on prior studies, absent gallbladder, fatty liver, normal bowel, small right kidney cysts.  Labs revealed creatinine 1.6, GFR 35, slightly elevated alkaline phosphatase 146 with otherwise normal LFTs including normal bilirubin, normal CBC, lipase. Patient was treated for UTI with antibiotics.  EGD from April 2025 revealed a 6 mm gastric polyp, small hiatal hernia, pathology from gastric polyp was benign, antral biopsies revealed focal intestinal metaplasia, no H. pylori present. Normal duodenum.  Patient is following up with oncology closely and was started on Xermelto and Lanreotide. PET ordered. Looks like this was already done   She reports periodic abdominal pain, starts from her lower abdomen, then also occurs in upper abdomen and also moves into her chest. she feels bloated. abdominal symptoms triggered by even drinking water / any food.  she started mounjaro for 4 weeks but her GI symptoms predate this.   after taking antibiotics for her UTI recently she felt her GI symptoms got better.   Dr Becker is her cardiologist  March 2025: Language line  was offered and used.  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  CAT scan chest abdomen pelvis in September 2024 reveals no recurrent or metastatic disease.  KUB from September 2023 revealed stool in the ascending and transverse colon.    Colonoscopy in April 2024 revealed an excellent bowel prep with diminutive sigmoid colon polyp, small hepatic flexure polyp and another diminutive hepatic flexure polyp. Pathology revealed hyperplastic polyps.  EGD in August 22 revealed 2 small gastric polyps with otherwise normal exam. Polyps were benign fundic gland polyps. No intestinal metaplasia was noted.  History of tubular adenoma from sigmoid colon removed in 2021.  Duodenal polypectomy in March 2021 revealed a well-differentiated neuroendocrine tumor with no mitotic figures and CD56 and Ki-67 less than 3% this lesion was removed only with biopsy forceps. Measured approximately 5 mm in size.  she has tried to stop PPI but her gerd symptoms are very severe off PPI. occasional intermittent left upper abdominal pain X unrelated to meals  10/24 OV  Language line  was offered and used. S/p colonoscopy which revealed multiple HP polyps, internal hemorrhoids present, otherwise unremarkable, 5 year recall advised, patient admits to continued diarrhea, she has mild lower abdominal cramping discomfort which gets better after a BM. The patient has continued to take anti-diarrheal meds such as loperamide prn 3-5x a week. Incomplete evacuation, notes occasional straining. The patient reports episodes of fecal incontinence before, also admits to fecal urgency. No ANORM testing before. CT a/p unremarkable   Colon 2024  The examined portion of the ileum was normal. One diminutive (1-3 mm) polyp in the sigmoid colon, removed with a cold biopsy forceps. Resected and retrieved. - HP  One small (4-6 mm) polyp at the hepatic flexure, removed with a cold snare. Resected and retrieved. - HP  One diminutive (1-3 mm) polyp at the hepatic flexure, removed with a cold snare. Resected and retrieved. - HP  Internal hemorrhoids.  03/24 OV  Language line  was offered and used. KUB revealed constipation, and suspected overflow diarrhea, advised the patient to hold all anti-diarrheal medications (lomotil, questran, dicyclomine) and to replace w/ Miralax + Metamucil, since holding medications she notes improvement in her bowel frequency, but continues to have loose stool,  C. diff stool PCR negative, due for colon recall, last PET scan w/ Dr. Mcclain reveals no metastatic disease - Patient admits to hard stools in the morning, she will take dicyclomine prn for abdominal discomfort still, she has been continuing to take loperamide regularly for her frequent bowels  - No appetite changes, deneis unintentional weight loss, emesis (but notes occasional nausea), BRBPR, melena, SOB/CP, notes abdominal pain worse before a BM which resolves after a BM  No known family h/o colon cancer/polyps. Denies cardiac hardware, dialysis, blood thinner use, and or issues with anesthesia   September 2023 visit: Language line  was offered and used. Stool studies from May 2023 confirmed borderline elevation in fecal calprotectin of 68 and noted to have C. difficile but no toxin A/B, normal pancreatic elastase. Patient was treated with vancomycin oral for 10 days. seeing dr nathan, saw her just this week. continue to have diarrhea - improved from before. diarrhea intensity varies . upto 10 times per day. somedays there is no diarrhea. on somatuline every 4 weeks. intermittent diarrhea and constipation. takes loperamide for diarrhea which helps. takes questran powder daily. not using welchol. takes dicylomine BID which helps with abdominal pain.  May 2023 visit: Stool studies in 5/16/2023 revealed a borderline fecal calprotectin of 68.4 with C. difficile detected but toxin A/B not present.  Given symptoms of diarrhea we still prescribed patient a 10-day course of vancomycin.  Fecal elastase level was normal at 231.  February 2023 visit: Right upper quadrant ultrasound in November 22 revealed normal liver, patent portal vein, normal bile duct, absent gallbladder, 1.4 cm right kidney cyst Scheduled to have PET CT Dotatate by oncology.  Continues to have intermittent sporadin diarrhea and takes loperamide prn. reports abdominal bloating and nausea. reports DM is doing good. forgot to get list of meds.  October 22 visit: Language line  was offered and used.  We attempted EGD in July 22 which was aborted because of presence of food in the stomach, brief exam revealed no endoscopic abnormalities and gastric biopsy did not reveal any H. pylori or intestinal metaplasia.  We repeated a EGD in August which once again was aborted because of presence of food.  A third EGD was performed again in August 22 which revealed 2 small gastric body polyps, no visible endoscopic abnormalities otherwise in the esophagus or duodenum, gastric biopsy did not reveal any intestinal metaplasia or H. pylori and both gastric body polyps are benign fundic gland polyps.  Diarrhea is intermittent. better with loperamide. not on narcotics. DM on oral meds.  Rt sided abdominal discomfort X 1 yr. intermittent symptoms. hx cholecystectomy. Pt reports CT scan done by dr nathan within past 2 months was normal. we dont have this report - it was done in her office.  July 2022: Language line  was offered and used. using somatostatin 1 time per month. using loperamide prn. using bentyl TID prn. on omeprazole 40 mg. diarrhea is not always associated with meals. no rectal bleeding. periodic blackish stools. denies using peptobismol.  January 2022 visit:  Language line  was offered and used.  A colonoscopy was performed in October 2021 which revealed an adequate prep, normal TI, biopsies were obtained from throughout the colon as well as TI to evaluate for chronic diarrhea, small ascending colon polyp removed: Hyperplastic polyp, another 8 mm sigmoid colon polyp removed: Tubular adenoma, left-sided diverticulosis, repeat colonoscopy advised in 2024.  No endoscopic colitis on path. MRI abdomen with and without contrast in July 2021 did not reveal any suspicious pancreatic lesion, normal liver, absent gallbladder, no dilation of the pancreatic duct present, a small area of focal fat seen in the inferior aspect of the pancreatic body, mild adrenal gland hyperplasia.   Diarrhea resolved. takes loperamide prn. takes sandostatin 1 per month. regular po intake no abdominal pain.  September 2021 visit:  Language line  was offered and used.  Patient underwent a MRI of the abdomen with and without contrast in July 2021 which revealed a normal-appearing liver, absent gallbladder, 4 mm lesion along the inferior aspect of the pancreatic body most likely reflecting a small area of focal fat, mild adrenal hyperplasia, scattered kidney cysts, subcentimeter abdominal lymph nodes, no suspicious pancreatic lesion identified. Labs from September 2021 revealed a hemoglobin of 11.8, normal WBC, normal platelet count, creatinine 1.4, normal LFTs except slightly elevated alkaline phosphatase of 128, ferritin 19. Patient underwent a EGD in June 2021 which revealed mild gastritis, patchy erythematous and whitish appearing specks in the gastric antrum which were biopsied, no small bowel lesion up to second portion of duodenum, biopsies from duodenal bulb revealed peptic duodenitis, gastric biopsies revealed foveolar hyperplasia and focal intestinal metaplasia, small bowel biopsy did not reveal any celiac disease.  Patient did have a colonoscopy in December 2020 which revealed a 1.5 cm distal ascending colon polyp that was removed piecemeal and therefore a repeat colonoscopy was advised in 6 months, diverticulosis was seen in the sigmoid colon.   Diarrhea 3-4 times per week. on lomotil / another capsule - cant recall name. pt seeing dr nathan. on monthly sandostatin. normal po intake. no nausea. not on tpn.   May 2021 visit:  Patient was seen in March 2021 for nausea vomiting and diarrhea.  Recovered from COVID-19 infection in January 2021.  Was found to have acute renal failure needing dialysis.  Was also admitted to the ICU for septic shock.  Patient had admission to [facility] in February 2021 and was diagnosed with viral gastroenteritis, a CT scan there had revealed a 6 mm hypoattenuating pancreatic tail lesion with a small site that IPMN.  Colonoscopy in December 2020 with Dr. Gutiérrez revealed distal ascending colon tubular adenoma and sigmoid diverticulosis.  Stool tests were negative for C. difficile.  CT abdomen pelvis without IV contrast revealed no acute findings in March 2021.  LFT abnormalities were felt to be secondary to shock.  Patient did have blood culture positive for gram-negative rods and was treated with antibiotics.  EGD March 2021 revealed a normal esophagus, small gastric polyp which was removed:fundic gland polyp, anterior wall duodenal polypoid lesion measuring 5 mm in size which was removed with cold biopsy forceps:well-differentiated neuroendocrine tumor CT chest abdomen and pelvis with contrast in April 2021 revealed indeterminate few soft tissue nodular densities in the ventral pelvic soft tissue largest measuring 2.7 x 2.2 cm, mildly enlarged spleen, mildly prominent endometrium, IR guided biopsy of abdominal nodules revealed extensive fat necrosis with foreign body giant cell reaction with no malignancy identified. Hepatitis B serology negative, hepatitis C antibody negative.  Elevated chromogranin A level normal serotonin, VIP, gastrin, urine HIAA level was 4.4  and repeated again 3.9.   at present no  vomiting, diarrhea. mild nausea.  Not on diaylsis at present. good appetite. but on tpn. normal gastric emptying study in march 2021. She reports intermittent abdominal bloating as well as discomfort in her abdomen just prior to defecation.  She is already taking daily PPIMay 2023 visit:

## 2025-07-07 NOTE — HPI-TODAY'S VISIT:
July 2025: She is here with her grandson who is acting as a . Er visit to ed on 7/6/25 with abdominal pain. CT a/p wit iv contrast - no acute process, normal liver, pancreas, absent GB.  , normal bili, normal ast/alt. cr 1.6, GFR 35, , normal lipase, normal CBC Pain has been present for 6 -8  months but on mounjaro since only 2 months. she doesnt believe mounjaro is cause for her symptoms.  She reports constant abdominal pain, with waxing and waning episodes but worse with po intake of food and water. pain is located in epigastrium and radiates up her chest. she describes frequent dysphagia and heartburn. Despite taking lansoprazole no relief in heartburn.  remains on mounjaro for DM. frequent nausea , sometimes emesis. She took nortyptyline for 4 weeks but unsure if meds helped with her symptoms.  according to her DM is well controlled. No diarrhea any more.  calpro stool borderline elevated to 80s. no c diff. PET CT scan from 5/19/2025 did not show any evidence of recurrent disease She also did not do her SIBO test as recommended.

## 2025-07-14 ENCOUNTER — OFFICE VISIT (OUTPATIENT)
Dept: URBAN - METROPOLITAN AREA CLINIC 25 | Facility: CLINIC | Age: 70
End: 2025-07-14

## 2025-07-15 ENCOUNTER — OFFICE VISIT (OUTPATIENT)
Dept: URBAN - METROPOLITAN AREA CLINIC 84 | Facility: CLINIC | Age: 70
End: 2025-07-15

## 2025-07-21 ENCOUNTER — OFFICE VISIT (OUTPATIENT)
Dept: URBAN - METROPOLITAN AREA CLINIC 25 | Facility: CLINIC | Age: 70
End: 2025-07-21

## 2025-08-07 ENCOUNTER — TELEPHONE ENCOUNTER (OUTPATIENT)
Dept: URBAN - METROPOLITAN AREA CLINIC 25 | Facility: CLINIC | Age: 70
End: 2025-08-07

## 2025-08-26 ENCOUNTER — TELEPHONE ENCOUNTER (OUTPATIENT)
Dept: URBAN - METROPOLITAN AREA CLINIC 25 | Facility: CLINIC | Age: 70
End: 2025-08-26

## 2025-08-29 ENCOUNTER — WEB ENCOUNTER (OUTPATIENT)
Dept: URBAN - METROPOLITAN AREA CLINIC 25 | Facility: CLINIC | Age: 70
End: 2025-08-29